# Patient Record
Sex: MALE | Race: WHITE | URBAN - METROPOLITAN AREA
[De-identification: names, ages, dates, MRNs, and addresses within clinical notes are randomized per-mention and may not be internally consistent; named-entity substitution may affect disease eponyms.]

---

## 2023-05-19 ENCOUNTER — OFFICE VISIT (OUTPATIENT)
Dept: PRIMARY CARE | Facility: CLINIC | Age: 28
End: 2023-05-19
Payer: COMMERCIAL

## 2023-05-19 VITALS
DIASTOLIC BLOOD PRESSURE: 72 MMHG | HEIGHT: 71 IN | BODY MASS INDEX: 25.34 KG/M2 | HEART RATE: 63 BPM | WEIGHT: 181 LBS | OXYGEN SATURATION: 98 % | SYSTOLIC BLOOD PRESSURE: 117 MMHG | TEMPERATURE: 97.9 F

## 2023-05-19 DIAGNOSIS — Z76.89 ENCOUNTER TO ESTABLISH CARE: Primary | ICD-10-CM

## 2023-05-19 DIAGNOSIS — R68.82 DECREASED LIBIDO: ICD-10-CM

## 2023-05-19 DIAGNOSIS — R53.83 FATIGUE, UNSPECIFIED TYPE: ICD-10-CM

## 2023-05-19 PROCEDURE — 1036F TOBACCO NON-USER: CPT | Performed by: STUDENT IN AN ORGANIZED HEALTH CARE EDUCATION/TRAINING PROGRAM

## 2023-05-19 PROCEDURE — 99203 OFFICE O/P NEW LOW 30 MIN: CPT | Performed by: STUDENT IN AN ORGANIZED HEALTH CARE EDUCATION/TRAINING PROGRAM

## 2023-05-19 RX ORDER — SERTRALINE HYDROCHLORIDE 100 MG/1
100 TABLET, FILM COATED ORAL DAILY
COMMUNITY
End: 2023-10-27 | Stop reason: SDUPTHER

## 2023-05-19 RX ORDER — TIAGABINE HYDROCHLORIDE 4 MG/1
4 TABLET, FILM COATED ORAL NIGHTLY
COMMUNITY
End: 2023-12-12 | Stop reason: SDUPTHER

## 2023-05-19 ASSESSMENT — PAIN SCALES - GENERAL: PAINLEVEL: 0-NO PAIN

## 2023-05-19 NOTE — PROGRESS NOTES
Establish Care  - No acute concerns today   - PMHx notable for anxiety  - Takes Sertaline 100mg and Tiagabine 4mg   - Denies any PSHx  - Allergy to Cefecil; hives  - Potential shellfish allergy  - Denies any tobacco use; former vape user  - Occasional alcohol use  - Sexually active; defers STD testing   - maternal aunt breast cancer and maternal grandfather leukimia; dad has high blood pressure and diabetes  - Works in [employer]  - Requesting Testosterone level check due to low energy and concern for libido    Physical Exam  Constitutional: Well developed, awake, alert, oriented x3  Head and Face: NCAT  Eyes: Normal external exam, EOMI  ENT: Normal external inspection of ears and nose. Oropharynx normal.  Cardiovascular: RRR, S1/S2, no murmurs, rubs, or gallops, radial pulses +2, no edema of extremities  Pulmonary: CTAB, no respiratory distress.  Abdomen: +BS, soft, non-tender, nondistended, no guarding or rebound, no masses noted  Neuro: A&O x3, CN II-XII grossly intact  MSK: No joint swelling, normal movements of all extremities. Range of motion- normal.  Skin- No lesions, contusions, or erythema.  Psychiatric: Judgment intact. Appropriate mood and behavior     28 year old M presenting to clinic to establish care with a new PCP.    #Establish Care  - CBC ordered  - CMP ordered  - Lipid panel ordered  - A1C ordered  - Syphilis screening ordered  - HIV ordered  - HCV ordered     #Low energy  #Decreased libido  - Testosterone level ordered  - Advised that patient will need to get level in the morning for most accurate reading    Discussed with Dr. Troy Maxwell MD PGY-3  Family Medicine   [facility]

## 2023-06-12 NOTE — PROGRESS NOTES
I reviewed with the resident the medical history and the resident’s findings on physical examination.  I discussed with the resident the patient’s diagnosis and concur with the treatment plan as documented in the resident note.     Marino Simmons MD

## 2023-09-30 PROBLEM — L85.8 OTHER SPECIFIED EPIDERMAL THICKENING: Status: ACTIVE | Noted: 2023-04-26

## 2023-09-30 PROBLEM — L57.9 SKIN CHANGES DUE TO CHRONIC EXPOSURE TO NONIONIZING RADIATION, UNSPECIFIED: Status: ACTIVE | Noted: 2023-04-26

## 2023-09-30 PROBLEM — L81.4 OTHER MELANIN HYPERPIGMENTATION: Status: ACTIVE | Noted: 2023-04-26

## 2023-09-30 PROBLEM — D22.5 MELANOCYTIC NEVI OF TRUNK: Status: ACTIVE | Noted: 2023-04-26

## 2023-09-30 PROBLEM — D22.39 MELANOCYTIC NEVI OF OTHER PARTS OF FACE: Status: ACTIVE | Noted: 2023-04-26

## 2023-09-30 PROBLEM — N50.9 TESTICULAR ABNORMALITY: Status: ACTIVE | Noted: 2023-09-30

## 2023-09-30 PROBLEM — D22.4 MELANOCYTIC NEVI OF SCALP AND NECK: Status: ACTIVE | Noted: 2023-04-26

## 2023-09-30 PROBLEM — D22.60 MELANOCYTIC NEVI OF UNSPECIFIED UPPER LIMB, INCLUDING SHOULDER: Status: ACTIVE | Noted: 2023-04-26

## 2023-09-30 PROBLEM — Q55.22 RETRACTIBLE TESTIS: Status: ACTIVE | Noted: 2023-09-30

## 2023-09-30 PROBLEM — D22.70 MELANOCYTIC NEVI OF UNSPECIFIED LOWER LIMB, INCLUDING HIP: Status: ACTIVE | Noted: 2023-04-26

## 2023-09-30 PROBLEM — F41.1 GENERALIZED ANXIETY DISORDER: Status: ACTIVE | Noted: 2023-09-30

## 2023-09-30 PROBLEM — G47.00 INSOMNIA: Status: ACTIVE | Noted: 2023-09-30

## 2023-09-30 RX ORDER — AMMONIUM LACTATE 12 G/100G
1 LOTION TOPICAL
COMMUNITY
Start: 2023-04-26

## 2023-09-30 RX ORDER — TIAGABINE HYDROCHLORIDE 4 MG/1
2 TABLET, FILM COATED ORAL DAILY
COMMUNITY
End: 2023-12-12 | Stop reason: SDUPTHER

## 2023-09-30 RX ORDER — TRAZODONE HYDROCHLORIDE 50 MG/1
1 TABLET ORAL NIGHTLY PRN
COMMUNITY
End: 2024-06-05 | Stop reason: SDUPTHER

## 2023-10-03 ENCOUNTER — APPOINTMENT (OUTPATIENT)
Dept: BEHAVIORAL HEALTH | Facility: CLINIC | Age: 28
End: 2023-10-03
Payer: COMMERCIAL

## 2023-10-03 ENCOUNTER — TELEMEDICINE (OUTPATIENT)
Dept: BEHAVIORAL HEALTH | Facility: CLINIC | Age: 28
End: 2023-10-03
Payer: COMMERCIAL

## 2023-10-03 DIAGNOSIS — F41.1 GENERALIZED ANXIETY DISORDER: Primary | ICD-10-CM

## 2023-10-03 DIAGNOSIS — F42.2 MIXED OBSESSIONAL THOUGHTS AND ACTS: ICD-10-CM

## 2023-10-03 PROCEDURE — 90837 PSYTX W PT 60 MINUTES: CPT | Performed by: PSYCHOLOGIST

## 2023-10-03 NOTE — PROGRESS NOTES
START TIME:  9 AM  END TIME:  10 AM    Diagnoses/Problems  RAKESH  OCD  Major Depressive Disorder, single episode, mild       Orders  Patient agreed to return for psychotherapy with this provider.     Individual Treatment Goals:    1. Eliminate OCD behaviors  2. Improve decision making ability- ACHIEVED  3. Improve ability to commit to decisions- ACHIEVED  4. Improve self-esteem    Note dictated with [employer] transcription software. Completed without full typed error editing and sent to avoid delay.         Chief Complaint    An interactive audio and video telecommunication system which permits real time communications between the patient (at the originating site) and provider (at the distant site) was utilized to provide this telehealth service.    Verbal consent was requested and obtained from CASSI MÉNDEZ on this date, 10/03/2023 at 9AM , for a telehealth visit.       Past Medical History  Problems    · Patient denies significant medical history    Social History  Problems    · Never a smoker   · Occasional alcohol use    Current Meds    Medication Name Instruction   Sertraline HCl - 100 MG Oral Tablet Take 1 tablet daily   tiaGABine HCl - 4 MG Oral Tablet TAKE 2 TABLETS BY MOUTH EVERY DAY (MAX DAILY DOSAGE IS 8MG)   traZODone HCl - 50 MG Oral Tablet TAKE 1 TABLET BY MOUTH EVERY DAY AT BEDTIME AS NEEDED FOR SLEEP     Mental Status Exam  No suicidal ideation nor intent.       Narrative    Telephone/Televideo Informed Consent for Psychotherapy was reviewed with the patient as follows:  There are potential benefits and risks of the use of telephone or video-conferencing that differ from in-person sessions. Specifically, the telephone or televideo system we are using may not be HIPAA compliant and may present limits to patient confidentiality. Confidentiality still applies for telepsychology services, and nobody will record the session without your permission. You agree to use the telephone or  "video-conferencing platform selected for our virtual sessions, and I will explain how to use it.  1) You need to use a webcam or smartphone during the session.  2) It is important that you be in a quiet, private space that is free of distractions (including cell phone or other devices) during the session.  3) It is important to use a secure internet connection rather than public/free Wi-Fi.  4) It is important to be on time. If you need to cancel or change your tele-appointment, you must notify the psychologist in advance by phone or email.  5) We need a back-up plan (e.g., phone number where you can be reached) to restart the session or to reschedule it, in the event of technical problems.  6) We need a safety plan that includes at least one emergency contact and the closest emergency room to your location, in the event of a crisis situation.  7) If you are not an adult, we need the permission of your parent or legal guardian (and their contact information) for you to participate in telepsychology sessions.  Understanding and verbal agreement was attested to by the patient.    Patient was reached via video for today's session.  Patient reported that he has been doing fairly well lately.  He said that he ended up weighing himself when he was about 180 pounds.  Within a few days he got back down to 165.  Patient said that he is ready to be done.  In fact, it sounds like he has a plan to spend a week after his next vacation which will be over next week, trying to get to his goal weight but after that week stopping and no longer weighing himself.  I agreed that this is a good plan.  Patient has been practicing gratitude.  He said that he has not been writing it down however.  He noticed that much of what he is grateful for is the relationships in his life.  Patient said that his grandmother is in the ICU and they are unsure what is wrong with her but it sounds like she is \"giving up\" after her  .  We talked " about what it was like growing up with his father and other influences in his life.  Patient tended to look up to motivational speakers and other hyper masculine men while growing up because his father was perceived to be the opposite.  We plan to follow up on this in more in the next session.

## 2023-10-12 ENCOUNTER — TELEMEDICINE (OUTPATIENT)
Dept: BEHAVIORAL HEALTH | Facility: CLINIC | Age: 28
End: 2023-10-12
Payer: COMMERCIAL

## 2023-10-12 DIAGNOSIS — F41.1 GENERALIZED ANXIETY DISORDER: Primary | ICD-10-CM

## 2023-10-12 DIAGNOSIS — F42.2 MIXED OBSESSIONAL THOUGHTS AND ACTS: ICD-10-CM

## 2023-10-12 PROCEDURE — 90837 PSYTX W PT 60 MINUTES: CPT | Performed by: PSYCHOLOGIST

## 2023-10-12 NOTE — PROGRESS NOTES
RT responded to \"neuro alert\" in ED room 10.  Pt was on RA.  SpO2 98%.   RT was not needed at this time per medical staff in patient's room.   START TIME:  5 PM  END TIME:  6 PM      Diagnoses/Problems  RAKESH  OCD  Major Depressive Disorder, single episode, mild       Orders  Patient agreed to return for psychotherapy with this provider.     Individual Treatment Goals:    1. Eliminate OCD behaviors  2. Improve decision making ability- ACHIEVED  3. Improve ability to commit to decisions- ACHIEVED  4. Improve self-esteem    Note dictated with [employer] transcription software. Completed without full typed error editing and sent to avoid delay.         Chief Complaint    An interactive audio and video telecommunication system which permits real time communications between the patient (at the originating site) and provider (at the distant site) was utilized to provide this telehealth service.    Verbal consent was requested and obtained from CASSI MÉNDEZ on this date, 10/12/2023 at 5PM , for a telehealth visit.       Past Medical History  Problems    · Patient denies significant medical history    Social History  Problems    · Never a smoker   · Occasional alcohol use    Current Meds    Medication Name Instruction   Sertraline HCl - 100 MG Oral Tablet Take 1 tablet daily   tiaGABine HCl - 4 MG Oral Tablet TAKE 2 TABLETS BY MOUTH EVERY DAY (MAX DAILY DOSAGE IS 8MG)   traZODone HCl - 50 MG Oral Tablet TAKE 1 TABLET BY MOUTH EVERY DAY AT BEDTIME AS NEEDED FOR SLEEP     Mental Status Exam  No suicidal ideation nor intent.       Narrative    Telephone/Televideo Informed Consent for Psychotherapy was reviewed with the patient as follows:  There are potential benefits and risks of the use of telephone or video-conferencing that differ from in-person sessions. Specifically, the telephone or televideo system we are using may not be HIPAA compliant and may present limits to patient confidentiality. Confidentiality still applies for telepsychology services, and nobody will record the session without your permission. You agree to use the telephone or  video-conferencing platform selected for our virtual sessions, and I will explain how to use it.  1) You need to use a webcam or smartphone during the session.  2) It is important that you be in a quiet, private space that is free of distractions (including cell phone or other devices) during the session.  3) It is important to use a secure internet connection rather than public/free Wi-Fi.  4) It is important to be on time. If you need to cancel or change your tele-appointment, you must notify the psychologist in advance by phone or email.  5) We need a back-up plan (e.g., phone number where you can be reached) to restart the session or to reschedule it, in the event of technical problems.  6) We need a safety plan that includes at least one emergency contact and the closest emergency room to your location, in the event of a crisis situation.  7) If you are not an adult, we need the permission of your parent or legal guardian (and their contact information) for you to participate in telepsychology sessions.  Understanding and verbal agreement was attested to by the patient.    Patient was reached via video for today's session.  Patient said he went on a vacation with his siblings to [place] and enjoyed himself despite having all sorts of trouble with the airline when they were trying to get there.  Patient said he has decided to continue the diet for another 2 weeks (but not as intensely) and then go back to a normal diet.  He said he is not doing the reverse diet like previously planned.  We spent most of today's session talking about his childhood and how that impacts him today.  Patient explained that he was having about 4 panic attacks, spread throughout the day, every day for 3-4 years from 3rd grade to 7th grade.  He said his mother would often yell at him when he was having panic attacks and his father was having panic attacks as well.  Unfortunately, patient was put in a position as a child to try to  "support both his father and try to prevent stress from affecting his younger brother (5 years younger).  Patient stated that he had zero support as a child and we agreed that his need for control and certainty stems from these experiences.  I explained how trauma impacts people and how it can be difficult to separate the past from the present.  We agreed that he tries to take control over everything and when he cannot control things, he becomes overwhelmed.  We discussed how his brain is still \"believing\" that he has no control, which was the case when he was a child.  Patient admitted to feeling quite distressed by talking about his childhood and that he worries that talking about it more with open \"pandora's box.\"  We discussed how this is a common belief and why it is inaccurate.  We plan to follow up more on this next session.    "

## 2023-10-24 ENCOUNTER — TELEMEDICINE (OUTPATIENT)
Dept: BEHAVIORAL HEALTH | Facility: CLINIC | Age: 28
End: 2023-10-24
Payer: COMMERCIAL

## 2023-10-24 DIAGNOSIS — F42.2 MIXED OBSESSIONAL THOUGHTS AND ACTS: ICD-10-CM

## 2023-10-24 DIAGNOSIS — F41.1 GENERALIZED ANXIETY DISORDER: Primary | ICD-10-CM

## 2023-10-24 PROCEDURE — 90837 PSYTX W PT 60 MINUTES: CPT | Performed by: PSYCHOLOGIST

## 2023-10-24 NOTE — PROGRESS NOTES
START TIME:  6 PM  END TIME:  7 PM      Diagnoses/Problems  RAKESH  OCD  Major Depressive Disorder, single episode, mild       Orders  Patient agreed to return for psychotherapy with this provider.     Individual Treatment Goals:    1. Eliminate OCD behaviors  2. Improve decision making ability- ACHIEVED  3. Improve ability to commit to decisions- ACHIEVED  4. Improve self-esteem    Note dictated with [employer] transcription software. Completed without full typed error editing and sent to avoid delay.         Chief Complaint    An interactive audio and video telecommunication system which permits real time communications between the patient (at the originating site) and provider (at the distant site) was utilized to provide this telehealth service.    Verbal consent was requested and obtained from CASSI MÉNDEZ on this date, 10/12/2023 at 5PM , for a telehealth visit.       Past Medical History  Problems    · Patient denies significant medical history    Social History  Problems    · Never a smoker   · Occasional alcohol use    Current Meds    Medication Name Instruction   Sertraline HCl - 100 MG Oral Tablet Take 1 tablet daily   tiaGABine HCl - 4 MG Oral Tablet TAKE 2 TABLETS BY MOUTH EVERY DAY (MAX DAILY DOSAGE IS 8MG)   traZODone HCl - 50 MG Oral Tablet TAKE 1 TABLET BY MOUTH EVERY DAY AT BEDTIME AS NEEDED FOR SLEEP     Mental Status Exam  No suicidal ideation nor intent.       Narrative    Telephone/Televideo Informed Consent for Psychotherapy was reviewed with the patient as follows:  There are potential benefits and risks of the use of telephone or video-conferencing that differ from in-person sessions. Specifically, the telephone or televideo system we are using may not be HIPAA compliant and may present limits to patient confidentiality. Confidentiality still applies for telepsychology services, and nobody will record the session without your permission. You agree to use the telephone or  video-conferencing platform selected for our virtual sessions, and I will explain how to use it.  1) You need to use a webcam or smartphone during the session.  2) It is important that you be in a quiet, private space that is free of distractions (including cell phone or other devices) during the session.  3) It is important to use a secure internet connection rather than public/free Wi-Fi.  4) It is important to be on time. If you need to cancel or change your tele-appointment, you must notify the psychologist in advance by phone or email.  5) We need a back-up plan (e.g., phone number where you can be reached) to restart the session or to reschedule it, in the event of technical problems.  6) We need a safety plan that includes at least one emergency contact and the closest emergency room to your location, in the event of a crisis situation.  7) If you are not an adult, we need the permission of your parent or legal guardian (and their contact information) for you to participate in telepsychology sessions.  Understanding and verbal agreement was attested to by the patient.    Patient was reached via video for today's session.  We spent today's session processing what we had discussed in the previous session.  Patient said that he was rather emotional after our last session.  He said a few days later, he was listening to an audio book (The Subtle Art of Not Giving a Fuck) and this inspired him to write about our last session in addition to some of the things that he heard in the book.  Patient essentially came to the conclusion that after overcoming the daily panic attacks and other stressors of his childhood, he felt that he was capable of anything and most importantly wanted to ensure that he did not waste this capability.  Patient still feels like he is capable of almost anything but we agreed the problem is primarily related to the fact that he thinks there is an end goal that will Ga[place]ner him respect and  admiration.  Instead, we tried to reframe this as the most important thing being the fact that he thinks he is capable of these things and is certainly resilient.  Patient said when he thinks about his childhood, he feels wronged, sad, hurt, abandoned and upset.  He said there is certainly a part of him that resents his parents for not being able to be there for him in the way that he needed.  We talked about how his perceptions seems a lot related to survivor's guilt in a lot of ways although he did not experience that per se.  We agreed to follow up on this some more in the next session.

## 2023-10-27 ENCOUNTER — TELEPHONE (OUTPATIENT)
Dept: OTHER | Age: 28
End: 2023-10-27
Payer: COMMERCIAL

## 2023-10-27 DIAGNOSIS — F41.1 GENERALIZED ANXIETY DISORDER: ICD-10-CM

## 2023-10-27 RX ORDER — SERTRALINE HYDROCHLORIDE 100 MG/1
100 TABLET, FILM COATED ORAL DAILY
Qty: 30 TABLET | Refills: 1 | Status: SHIPPED | OUTPATIENT
Start: 2023-10-27 | End: 2023-12-06 | Stop reason: SDUPTHER

## 2023-11-02 ENCOUNTER — TELEMEDICINE (OUTPATIENT)
Dept: BEHAVIORAL HEALTH | Facility: CLINIC | Age: 28
End: 2023-11-02
Payer: COMMERCIAL

## 2023-11-02 DIAGNOSIS — F42.2 MIXED OBSESSIONAL THOUGHTS AND ACTS: ICD-10-CM

## 2023-11-02 DIAGNOSIS — F41.1 GENERALIZED ANXIETY DISORDER: ICD-10-CM

## 2023-11-02 PROCEDURE — 90837 PSYTX W PT 60 MINUTES: CPT | Performed by: PSYCHOLOGIST

## 2023-11-03 NOTE — PROGRESS NOTES
START TIME:  8 PM  END TIME:  8:55 PM      Diagnoses/Problems  RAKESH  OCD  Major Depressive Disorder, single episode, mild       Orders  Patient agreed to return for psychotherapy with this provider.     Individual Treatment Goals:    1. Eliminate OCD behaviors  2. Improve decision making ability- ACHIEVED  3. Improve ability to commit to decisions- ACHIEVED  4. Improve self-esteem    Note dictated with [employer] transcription software. Completed without full typed error editing and sent to avoid delay.         Chief Complaint    An interactive audio and video telecommunication system which permits real time communications between the patient (at the originating site) and provider (at the distant site) was utilized to provide this telehealth service.    Verbal consent was requested and obtained from CASSI MÉNDEZ      Past Medical History  Problems    · Patient denies significant medical history    Social History  Problems    · Never a smoker   · Occasional alcohol use    Mental Status Exam  No suicidal ideation nor intent.       Narrative    Telephone/Televideo Informed Consent for Psychotherapy was reviewed with the patient as follows:  There are potential benefits and risks of the use of telephone or video-conferencing that differ from in-person sessions. Specifically, the telephone or televideo system we are using may not be HIPAA compliant and may present limits to patient confidentiality. Confidentiality still applies for telepsychology services, and nobody will record the session without your permission. You agree to use the telephone or video-conferencing platform selected for our virtual sessions, and I will explain how to use it.  1) You need to use a webcam or smartphone during the session.  2) It is important that you be in a quiet, private space that is free of distractions (including cell phone or other devices) during the session.  3) It is important to use a secure internet connection rather  "than public/free Wi-Fi.  4) It is important to be on time. If you need to cancel or change your tele-appointment, you must notify the psychologist in advance by phone or email.  5) We need a back-up plan (e.g., phone number where you can be reached) to restart the session or to reschedule it, in the event of technical problems.  6) We need a safety plan that includes at least one emergency contact and the closest emergency room to your location, in the event of a crisis situation.  7) If you are not an adult, we need the permission of your parent or legal guardian (and their contact information) for you to participate in telepsychology sessions.  Understanding and verbal agreement was attested to by the patient.    Patient was reached via video for today's session.  Patient reported that he has been doing very well this week.  He said he has been doing great at work and is getting out to customers more.  We agreed that just getting out of his apartment and having social interactions for work probably plays a large role in how he feels now. Also, he stated that he is having trouble giving up dieting, however, he has changed his approach to it.  He is not counting calories or weighing himself but is eating \"very clean.\"  He has a sense of how many calories he is taking in but not obsessing over it.  He is actually lifting weights more and has been using the sauna which he said has been significantly improving the quality of his sleep.  He said he is beginning to value his ability to adapt and overcome challenges and is seeing this more as a character trait rather than a means to achieving something great.  He still wants to do great things but this new perspective has made it a little easier to focus on the process.  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  We discussed the impact of doing a little bit every day, over time.  We plan to " follow up on this more in the next session.

## 2023-11-09 ENCOUNTER — APPOINTMENT (OUTPATIENT)
Dept: BEHAVIORAL HEALTH | Facility: CLINIC | Age: 28
End: 2023-11-09
Payer: COMMERCIAL

## 2023-11-14 ENCOUNTER — TELEMEDICINE (OUTPATIENT)
Dept: BEHAVIORAL HEALTH | Facility: CLINIC | Age: 28
End: 2023-11-14
Payer: COMMERCIAL

## 2023-11-14 DIAGNOSIS — F42.2 MIXED OBSESSIONAL THOUGHTS AND ACTS: ICD-10-CM

## 2023-11-14 DIAGNOSIS — F41.1 GENERALIZED ANXIETY DISORDER: Primary | ICD-10-CM

## 2023-11-14 PROCEDURE — 90837 PSYTX W PT 60 MINUTES: CPT | Performed by: PSYCHOLOGIST

## 2023-11-15 NOTE — PROGRESS NOTES
START TIME:  7 PM  END TIME:  7:55 PM      Diagnoses/Problems  RAKESH  OCD  Major Depressive Disorder, single episode, mild       Orders  Patient agreed to return for psychotherapy with this provider.     Individual Treatment Goals:    1. Eliminate OCD behaviors  2. Improve decision making ability- ACHIEVED  3. Improve ability to commit to decisions- ACHIEVED  4. Improve self-esteem    Note dictated with [employer] transcription software. Completed without full typed error editing and sent to avoid delay.         Chief Complaint    An interactive audio and video telecommunication system which permits real time communications between the patient (at the originating site) and provider (at the distant site) was utilized to provide this telehealth service.    Verbal consent was requested and obtained from CASSI MÉNDEZ      Past Medical History  Problems    · Patient denies significant medical history    Social History  Problems    · Never a smoker   · Occasional alcohol use    Mental Status Exam  No suicidal ideation nor intent.       Narrative    Telephone/Televideo Informed Consent for Psychotherapy was reviewed with the patient as follows:  There are potential benefits and risks of the use of telephone or video-conferencing that differ from in-person sessions. Specifically, the telephone or televideo system we are using may not be HIPAA compliant and may present limits to patient confidentiality. Confidentiality still applies for telepsychology services, and nobody will record the session without your permission. You agree to use the telephone or video-conferencing platform selected for our virtual sessions, and I will explain how to use it.  1) You need to use a webcam or smartphone during the session.  2) It is important that you be in a quiet, private space that is free of distractions (including cell phone or other devices) during the session.  3) It is important to use a secure internet connection rather  than public/free Wi-Fi.  4) It is important to be on time. If you need to cancel or change your tele-appointment, you must notify the psychologist in advance by phone or email.  5) We need a back-up plan (e.g., phone number where you can be reached) to restart the session or to reschedule it, in the event of technical problems.  6) We need a safety plan that includes at least one emergency contact and the closest emergency room to your location, in the event of a crisis situation.  7) If you are not an adult, we need the permission of your parent or legal guardian (and their contact information) for you to participate in telepsychology sessions.  Understanding and verbal agreement was attested to by the patient.    Patient was reached via video for today's session.  Patient said that he has been doing well lately.  He said that he has been working out very hard and seeing results and this makes him feel really good.  We talked about how he feels like he has too pushed himself fairly hard in order to get pleasure out of doing things.  We discussed the pros and cons of this.  Patient does have a hard time figuring out when to stop because he falls into a reinforcement loop.  However, the patient does make sure to make time for sleep and of course exercise.  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  We also talked about how the patient's ex-girlfriend contacted him about their dog.  Patient was thrown off by this but it did not bother him that much.  He is not looking into what she has been up to lately and has no interest in getting back together with her.  We ended the session by talking about an oversight that payroll made which caused him to be over paid by $20,000.  Patient said that he has been getting overpaid all year and they just recognize this.  He is anxious about having to owe that money back  possibly over the course of just the next year.  We talked a little bit about his student loan that he owes back and how we can adjust the payment as well as his tax withholdings.  We plan to follow up on this and his difficulty enjoying things when he is not pushing himself more in the next session.

## 2023-11-17 ENCOUNTER — APPOINTMENT (OUTPATIENT)
Dept: BEHAVIORAL HEALTH | Facility: CLINIC | Age: 28
End: 2023-11-17
Payer: COMMERCIAL

## 2023-11-21 ENCOUNTER — TELEMEDICINE (OUTPATIENT)
Dept: BEHAVIORAL HEALTH | Facility: CLINIC | Age: 28
End: 2023-11-21
Payer: COMMERCIAL

## 2023-11-21 DIAGNOSIS — F41.1 GENERALIZED ANXIETY DISORDER: Primary | ICD-10-CM

## 2023-11-21 DIAGNOSIS — F42.2 MIXED OBSESSIONAL THOUGHTS AND ACTS: ICD-10-CM

## 2023-11-21 PROCEDURE — 90837 PSYTX W PT 60 MINUTES: CPT | Performed by: PSYCHOLOGIST

## 2023-11-21 NOTE — PROGRESS NOTES
START TIME:  6 PM  END TIME:  6:55 PM      Diagnoses/Problems  RAKESH  OCD  Major Depressive Disorder, single episode, mild       Orders  Patient agreed to return for psychotherapy with this provider.     Individual Treatment Goals:    1. Eliminate OCD behaviors  2. Improve decision making ability- ACHIEVED  3. Improve ability to commit to decisions- ACHIEVED  4. Improve self-esteem    Note dictated with [employer] transcription software. Completed without full typed error editing and sent to avoid delay.         Chief Complaint    An interactive audio and video telecommunication system which permits real time communications between the patient (at the originating site) and provider (at the distant site) was utilized to provide this telehealth service.    Verbal consent was requested and obtained from CASSI MÉNDEZ      Past Medical History  Problems    · Patient denies significant medical history    Social History  Problems    · Never a smoker   · Occasional alcohol use    Mental Status Exam  No suicidal ideation nor intent.       Narrative    Telephone/Televideo Informed Consent for Psychotherapy was reviewed with the patient as follows:  There are potential benefits and risks of the use of telephone or video-conferencing that differ from in-person sessions. Specifically, the telephone or televideo system we are using may not be HIPAA compliant and may present limits to patient confidentiality. Confidentiality still applies for telepsychology services, and nobody will record the session without your permission. You agree to use the telephone or video-conferencing platform selected for our virtual sessions, and I will explain how to use it.  1) You need to use a webcam or smartphone during the session.  2) It is important that you be in a quiet, private space that is free of distractions (including cell phone or other devices) during the session.  3) It is important to use a secure internet connection rather  than public/free Wi-Fi.  4) It is important to be on time. If you need to cancel or change your tele-appointment, you must notify the psychologist in advance by phone or email.  5) We need a back-up plan (e.g., phone number where you can be reached) to restart the session or to reschedule it, in the event of technical problems.  6) We need a safety plan that includes at least one emergency contact and the closest emergency room to your location, in the event of a crisis situation.  7) If you are not an adult, we need the permission of your parent or legal guardian (and their contact information) for you to participate in telepsychology sessions.  Understanding and verbal agreement was attested to by the patient.    Patient was reached via video for today's session.  Patient said that he has been doing well lately.  We talked about how his confidence has increased significantly.  We discussed the reasons for this and it primarily has to do with things that are mostly within his control such as exercise and effort put into work.  These are also things that he tends to be good at as well.  He said that he really has no interest in dating right now.  We talked about how this is likely because there are many variables outside of his control with dating and he does not feel especially confident dating.  Also, the way that he approaches problems and the rest of his life is not necessarily the way he should approach dating.  We talked about how in the past we had discussed the fact that when he starts caring about someone is when he begins to over think the relationship and try to be somewhat he is not.  However, it sounds like it is mostly when he gets negative feedback from someone he cares about that he begins to behave in this way.  Patient said that every person he is ever dated came into his life when he was not looking for anyone in particular.  We discussed the possible reasons for this and how we can maybe recreate  it.  Ultimately, we agreed that the patient should continue doing the things that are boosting his self-esteem and confidence and continue to put less pressure on himself to date.  We plan to follow up on this more in the next session.

## 2023-11-30 ENCOUNTER — TELEMEDICINE (OUTPATIENT)
Dept: BEHAVIORAL HEALTH | Facility: CLINIC | Age: 28
End: 2023-11-30
Payer: COMMERCIAL

## 2023-11-30 DIAGNOSIS — F41.1 GENERALIZED ANXIETY DISORDER: Primary | ICD-10-CM

## 2023-11-30 DIAGNOSIS — F42.2 MIXED OBSESSIONAL THOUGHTS AND ACTS: ICD-10-CM

## 2023-11-30 PROCEDURE — 90837 PSYTX W PT 60 MINUTES: CPT | Performed by: PSYCHOLOGIST

## 2023-11-30 NOTE — PROGRESS NOTES
START TIME:  6 PM  END TIME:  6:53 PM      Diagnoses/Problems  RAKESH  OCD  Major Depressive Disorder, single episode, mild       Orders  Patient agreed to return for psychotherapy with this provider.     Individual Treatment Goals:    1. Eliminate OCD behaviors  2. Improve decision making ability- ACHIEVED  3. Improve ability to commit to decisions- ACHIEVED  4. Improve self-esteem    Note dictated with [employer] transcription software. Completed without full typed error editing and sent to avoid delay.         Chief Complaint    An interactive audio and video telecommunication system which permits real time communications between the patient (at the originating site) and provider (at the distant site) was utilized to provide this telehealth service.    Verbal consent was requested and obtained from CASSI MÉNDEZ      Past Medical History  Problems    · Patient denies significant medical history    Social History  Problems    · Never a smoker   · Occasional alcohol use    Mental Status Exam  No suicidal ideation nor intent.       Narrative    Telephone/Televideo Informed Consent for Psychotherapy was reviewed with the patient as follows:  There are potential benefits and risks of the use of telephone or video-conferencing that differ from in-person sessions. Specifically, the telephone or televideo system we are using may not be HIPAA compliant and may present limits to patient confidentiality. Confidentiality still applies for telepsychology services, and nobody will record the session without your permission. You agree to use the telephone or video-conferencing platform selected for our virtual sessions, and I will explain how to use it.  1) You need to use a webcam or smartphone during the session.  2) It is important that you be in a quiet, private space that is free of distractions (including cell phone or other devices) during the session.  3) It is important to use a secure internet connection rather  than public/free Wi-Fi.  4) It is important to be on time. If you need to cancel or change your tele-appointment, you must notify the psychologist in advance by phone or email.  5) We need a back-up plan (e.g., phone number where you can be reached) to restart the session or to reschedule it, in the event of technical problems.  6) We need a safety plan that includes at least one emergency contact and the closest emergency room to your location, in the event of a crisis situation.  7) If you are not an adult, we need the permission of your parent or legal guardian (and their contact information) for you to participate in telepsychology sessions.  Understanding and verbal agreement was attested to by the patient.    Patient was reached via video for today's session.  Patient reported that he has been especially tired lately.  He said that this has negatively affected his mood a bit.  He stated that he has been having difficulty staying awake during the day and even took a nap in the middle of the day earlier this week.  It seems highly likely that he is coming down with a cold or perhaps COVID.  He plans to test himself later tonight.  Patient said that he has been not exercising as much because of this.  We talked about how a lot of his self worth is tied to productivity.  We also discussed his reaction some negative feedback in general not just from girlfriends.  He tends to try and go above and beyond every time he gets negative feedback and this can lead to burnout or, in the case of dating, it just backfiring.  We agreed to follow up on this some more in the next session.

## 2023-12-05 ENCOUNTER — TELEMEDICINE (OUTPATIENT)
Dept: BEHAVIORAL HEALTH | Facility: CLINIC | Age: 28
End: 2023-12-05
Payer: COMMERCIAL

## 2023-12-05 DIAGNOSIS — F41.1 GENERALIZED ANXIETY DISORDER: ICD-10-CM

## 2023-12-05 DIAGNOSIS — F42.2 MIXED OBSESSIONAL THOUGHTS AND ACTS: ICD-10-CM

## 2023-12-05 PROCEDURE — 90837 PSYTX W PT 60 MINUTES: CPT | Performed by: PSYCHOLOGIST

## 2023-12-06 ENCOUNTER — TELEPHONE (OUTPATIENT)
Dept: OTHER | Age: 28
End: 2023-12-06
Payer: COMMERCIAL

## 2023-12-06 DIAGNOSIS — F41.1 GENERALIZED ANXIETY DISORDER: ICD-10-CM

## 2023-12-06 RX ORDER — SERTRALINE HYDROCHLORIDE 100 MG/1
100 TABLET, FILM COATED ORAL DAILY
Qty: 90 TABLET | Refills: 0 | Status: SHIPPED | OUTPATIENT
Start: 2023-12-06 | End: 2024-03-01

## 2023-12-06 NOTE — PROGRESS NOTES
START TIME:  8 PM  END TIME:  8:53 PM      Diagnoses/Problems  RAKESH  OCD  Major Depressive Disorder, single episode, mild       Orders  Patient agreed to return for psychotherapy with this provider.     Individual Treatment Goals:    1. Eliminate OCD behaviors  2. Improve decision making ability- ACHIEVED  3. Improve ability to commit to decisions- ACHIEVED  4. Improve self-esteem    Note dictated with [employer] transcription software. Completed without full typed error editing and sent to avoid delay.         Chief Complaint    An interactive audio and video telecommunication system which permits real time communications between the patient (at the originating site) and provider (at the distant site) was utilized to provide this telehealth service.    Verbal consent was requested and obtained from CASSI MÉNDEZ      Past Medical History  Problems    · Patient denies significant medical history    Social History  Problems    · Never a smoker   · Occasional alcohol use    Mental Status Exam  No suicidal ideation nor intent.       Narrative    Telephone/Televideo Informed Consent for Psychotherapy was reviewed with the patient as follows:  There are potential benefits and risks of the use of telephone or video-conferencing that differ from in-person sessions. Specifically, the telephone or televideo system we are using may not be HIPAA compliant and may present limits to patient confidentiality. Confidentiality still applies for telepsychology services, and nobody will record the session without your permission. You agree to use the telephone or video-conferencing platform selected for our virtual sessions, and I will explain how to use it.  1) You need to use a webcam or smartphone during the session.  2) It is important that you be in a quiet, private space that is free of distractions (including cell phone or other devices) during the session.  3) It is important to use a secure internet connection rather  than public/free Wi-Fi.  4) It is important to be on time. If you need to cancel or change your tele-appointment, you must notify the psychologist in advance by phone or email.  5) We need a back-up plan (e.g., phone number where you can be reached) to restart the session or to reschedule it, in the event of technical problems.  6) We need a safety plan that includes at least one emergency contact and the closest emergency room to your location, in the event of a crisis situation.  7) If you are not an adult, we need the permission of your parent or legal guardian (and their contact information) for you to participate in telepsychology sessions.  Understanding and verbal agreement was attested to by the patient.    Patient was reached via video for today's session.  Patient reported that he has been feeling a little bit better since last week.  We spent today's session primarily talking about relationships and how he can approach things differently going forward.  Patient said that he already feels like he is going to be approaching things differently because he feels a lot better about himself and he is not as afraid to be alone now.  Patient explained that he thinks that this is because he likes himself more.  We agreed that he is probably set up to find someone now as a result of this.  Patient said that he has 3 dates coming up and we talked about the mindset that he can take into these dates.  I strongly encouraged him to look at it as a 2 way street, meaning that they are both in a way evaluating each other and whether or not there is potential for a long-term relationship.  Often, it seems like the patient is mostly already convinced that he likes the person and that he needs to make sure that he does everything right.  At the end of the session, we talked about setting boundaries in relationships prior to being in the relationship when emotions easily cloud judgment.  We agreed to follow up on this more in  the next session.  Patient plans to make some notes between now and then about what potential boundaries could be for him.

## 2023-12-06 NOTE — TELEPHONE ENCOUNTER
Patient requesting 90 day supply medication order for Sertraline due to insurance requirements. Please submit to the [facility] on [address] in [facility].

## 2023-12-12 ENCOUNTER — TELEMEDICINE (OUTPATIENT)
Dept: BEHAVIORAL HEALTH | Facility: CLINIC | Age: 28
End: 2023-12-12
Payer: COMMERCIAL

## 2023-12-12 DIAGNOSIS — F42.2 MIXED OBSESSIONAL THOUGHTS AND ACTS: ICD-10-CM

## 2023-12-12 DIAGNOSIS — F41.1 GENERALIZED ANXIETY DISORDER: ICD-10-CM

## 2023-12-12 DIAGNOSIS — F41.1 GENERALIZED ANXIETY DISORDER: Primary | ICD-10-CM

## 2023-12-12 DIAGNOSIS — G47.00 INSOMNIA, UNSPECIFIED TYPE: ICD-10-CM

## 2023-12-12 PROCEDURE — 90834 PSYTX W PT 45 MINUTES: CPT | Performed by: PSYCHOLOGIST

## 2023-12-12 PROCEDURE — 99214 OFFICE O/P EST MOD 30 MIN: CPT | Performed by: PSYCHIATRY & NEUROLOGY

## 2023-12-12 RX ORDER — TIAGABINE HYDROCHLORIDE 4 MG/1
8 TABLET, FILM COATED ORAL NIGHTLY
Qty: 180 TABLET | Refills: 1 | Status: SHIPPED | OUTPATIENT
Start: 2023-12-12 | End: 2024-06-05 | Stop reason: SDUPTHER

## 2023-12-12 ASSESSMENT — ENCOUNTER SYMPTOMS
NERVOUS/ANXIOUS: 0
SLEEP DISTURBANCE: 0
DYSPHORIC MOOD: 0

## 2023-12-12 NOTE — PROGRESS NOTES
START TIME:  5:15 PM  END TIME:  6 PM      Diagnoses/Problems  RAKESH  OCD  Major Depressive Disorder, single episode, mild       Orders  Patient agreed to return for psychotherapy with this provider.     Individual Treatment Goals:    1. Eliminate OCD behaviors  2. Improve decision making ability- ACHIEVED  3. Improve ability to commit to decisions- ACHIEVED  4. Improve self-esteem    Note dictated with [employer] transcription software. Completed without full typed error editing and sent to avoid delay.         Chief Complaint    An interactive audio and video telecommunication system which permits real time communications between the patient (at the originating site) and provider (at the distant site) was utilized to provide this telehealth service.    Verbal consent was requested and obtained from CASSI MÉNDEZ      Past Medical History  Problems    · Patient denies significant medical history    Social History  Problems    · Never a smoker   · Occasional alcohol use    Mental Status Exam  No suicidal ideation nor intent.       Narrative    Telephone/Televideo Informed Consent for Psychotherapy was reviewed with the patient as follows:  There are potential benefits and risks of the use of telephone or video-conferencing that differ from in-person sessions. Specifically, the telephone or televideo system we are using may not be HIPAA compliant and may present limits to patient confidentiality. Confidentiality still applies for telepsychology services, and nobody will record the session without your permission. You agree to use the telephone or video-conferencing platform selected for our virtual sessions, and I will explain how to use it.  1) You need to use a webcam or smartphone during the session.  2) It is important that you be in a quiet, private space that is free of distractions (including cell phone or other devices) during the session.  3) It is important to use a secure internet connection rather  "than public/free Wi-Fi.  4) It is important to be on time. If you need to cancel or change your tele-appointment, you must notify the psychologist in advance by phone or email.  5) We need a back-up plan (e.g., phone number where you can be reached) to restart the session or to reschedule it, in the event of technical problems.  6) We need a safety plan that includes at least one emergency contact and the closest emergency room to your location, in the event of a crisis situation.  7) If you are not an adult, we need the permission of your parent or legal guardian (and their contact information) for you to participate in telepsychology sessions.  Understanding and verbal agreement was attested to by the patient.    Patient was reached via video for today's session.  Patient reported that he has been doing well lately.  We talked about how he feels like he is much younger than he actually is and has been getting this feedback lately.  We discussed aging in general and what this experience has been like.  Patient said that he went on one of his 3 dates and it went well but he was not especially attracted to the woman he went on the date with.  We discussed how he handled the situation at work very well.  Patient went into a situation knowing there was conflict and was able to separate his emotions from the other person's emotions in a way that made him much more effective.  He did not take it personally generally.  Patient also discussed some of the boundaries or aspects of someone that he is looking for in a relationship.  He said that he wants someone who is not impulsive generally, materialistic/narcissistic, transactional, only into them for his looks, inconsistent or overly dependent/independent.  He also wants to be with someone who is not trying to change him or criticize him at least in the beginning.  He described this as the relationship being \"easy.\"  He cannot be with someone who does not want children, is " "unable to be \"silly\" and does not make an effort.  Patient essentially wants someone who is fairly balanced in the way they approach the relationship and emotionally.  He continues to feel fairly confident about himself and we agreed that this is a great quality to take into a relationship because it will make him more likely to be himself in the relationship rather than trying to be the person that he believes they want him to be.  We plan to follow up on this more in the next session.  "

## 2023-12-12 NOTE — PROGRESS NOTES
"Adult Ambulatory Psychiatry Progress Note      Assessment/Plan     Impression:  Marino Barba is a 28 y.o. male domiciled alone, employed by [employer] who presents for follow up with [facility] Ins[facility].    Plan:   RAKESH - sertraline 100mg daily, c/w gabatril 4mg BID, c/w med ed, psycho ed and supportive psychotherapy, f/u 6 months  Insomnia - trazodone 50mg qhs prn        Subjective   HPI:  Pt arrived on time. Mood \"good\" since last session. He's been working for [employer] and finishing his first year. He's working in sales. Anxiety has been manageable. Sleeping ok. Appetite ok. Taking medicine as prescribed. Denies significant SE.           Review of Systems   Psychiatric/Behavioral:  Negative for dysphoric mood, sleep disturbance and suicidal ideas. The patient is not nervous/anxious.          Objective   Mental Status Exam:  General Appearance: Well groomed, appropriate eye contact  Attitude/Behavior: Cooperative  Motor: No psychomotor agitation or retardation, no tremor or other abnormal movements  Speech: Normal rate, volume, prosody  Mood: \"ok\"  Affect: Euthymic, full-range  Thought Process: Linear, goal directed  Thought Associations: No loosening of associations  Thought Content: Normal  Perception: No perceptual abnormalities noted  Insight: Intact  Judgement: Intact    Vitals:  There were no vitals filed for this visit.    Current Medications:  Current Outpatient Medications on File Prior to Visit   Medication Sig Dispense Refill    ammonium lactate (Lac-Hydrin) 12 % lotion 1 Application.      MINOXIDIL-FINASTERIDE TOP Apply topically. MINOXIDIL 6%-FINASTERIDE0.3%      sertraline (Zoloft) 100 mg tablet Take 1 tablet (100 mg) by mouth once daily. 90 tablet 0    traZODone (Desyrel) 50 mg tablet Take 1 tablet (50 mg) by mouth as needed at bedtime for sleep.      [DISCONTINUED] sertraline (Zoloft) 100 mg tablet Take 1 tablet (100 mg) by mouth once daily. 30 tablet 1    [DISCONTINUED] " tiaGABine (Gabitril) 4 mg tablet Take 1 tablet (4 mg) by mouth once daily at bedtime.      [DISCONTINUED] tiaGABine (Gabitril) 4 mg tablet Take 2 tablets (8 mg) by mouth once daily. Max dosage is 8 mg       No current facility-administered medications on file prior to visit.       Orders:  Diagnoses and all orders for this visit:  Generalized anxiety disorder  -     tiaGABine (Gabitril) 4 mg tablet; Take 2 tablets (8 mg) by mouth once daily at bedtime.  Insomnia, unspecified type  -     tiaGABine (Gabitril) 4 mg tablet; Take 2 tablets (8 mg) by mouth once daily at bedtime.          Time Spent:    Prep time: 3  Direct patient time: 22  Documentation time: 5  Total time: 30 min    Next Appointment:  Follow up in 6 months (on 6/5/2024).

## 2023-12-19 ENCOUNTER — TELEMEDICINE (OUTPATIENT)
Dept: BEHAVIORAL HEALTH | Facility: CLINIC | Age: 28
End: 2023-12-19
Payer: COMMERCIAL

## 2023-12-19 DIAGNOSIS — F42.2 MIXED OBSESSIONAL THOUGHTS AND ACTS: ICD-10-CM

## 2023-12-19 DIAGNOSIS — F41.1 GENERALIZED ANXIETY DISORDER: Primary | ICD-10-CM

## 2023-12-19 PROCEDURE — 90837 PSYTX W PT 60 MINUTES: CPT | Performed by: PSYCHOLOGIST

## 2023-12-20 NOTE — PROGRESS NOTES
START TIME:  7 PM  END TIME:  8 PM      Diagnoses/Problems  RAKESH  OCD  Major Depressive Disorder, single episode, mild       Orders  Patient agreed to return for psychotherapy with this provider.     Individual Treatment Goals:    1. Eliminate OCD behaviors  2. Improve decision making ability- ACHIEVED  3. Improve ability to commit to decisions- ACHIEVED  4. Improve self-esteem    Note dictated with [employer] transcription software. Completed without full typed error editing and sent to avoid delay.         Chief Complaint    An interactive audio and video telecommunication system which permits real time communications between the patient (at the originating site) and provider (at the distant site) was utilized to provide this telehealth service.    Verbal consent was requested and obtained from CASSI MÉNDEZ      Past Medical History  Problems    · Patient denies significant medical history    Social History  Problems    · Never a smoker   · Occasional alcohol use    Mental Status Exam  No suicidal ideation nor intent.       Narrative    Telephone/Televideo Informed Consent for Psychotherapy was reviewed with the patient as follows:  There are potential benefits and risks of the use of telephone or video-conferencing that differ from in-person sessions. Specifically, the telephone or televideo system we are using may not be HIPAA compliant and may present limits to patient confidentiality. Confidentiality still applies for telepsychology services, and nobody will record the session without your permission. You agree to use the telephone or video-conferencing platform selected for our virtual sessions, and I will explain how to use it.  1) You need to use a webcam or smartphone during the session.  2) It is important that you be in a quiet, private space that is free of distractions (including cell phone or other devices) during the session.  3) It is important to use a secure internet connection rather  than public/free Wi-Fi.  4) It is important to be on time. If you need to cancel or change your tele-appointment, you must notify the psychologist in advance by phone or email.  5) We need a back-up plan (e.g., phone number where you can be reached) to restart the session or to reschedule it, in the event of technical problems.  6) We need a safety plan that includes at least one emergency contact and the closest emergency room to your location, in the event of a crisis situation.  7) If you are not an adult, we need the permission of your parent or legal guardian (and their contact information) for you to participate in telepsychology sessions.  Understanding and verbal agreement was attested to by the patient.    Patient was reached via video for today's session.  We spent today's session mostly discussing the patient's increasing confidence.  Patient said that he feels great about himself and we discussed the various reasons why.  He explained that he had a phone call with his boss and his boss's boss in which they discussed the overpayment over this last year to the patient.  Patient was able to negotiate fairly good terms on him repaying this money.  He said he was impressed with his ability to be assertive and be calm under pressure.  Patient credits a lot of this to him going into difficult conversations with the expectation that they are going to be tense or difficult conversations.  Patient said that knowing this going and helps with feelings of uncertainty.  Also, he used evaluate how well conversations and other situations went by how he felt or whether or not there was tension at all.  We agreed that this certainly set him up for failure in the past.  We talked about how these realistic expectations, practicing assertiveness, not avoiding anxiety but rather accepting it and pushing through it and finally viewing himself as someone who pushes through challenges and tends to figure things out generally has  been the most helpful.  We agreed to follow up on this some more in the next session.

## 2023-12-26 ENCOUNTER — TELEMEDICINE (OUTPATIENT)
Dept: BEHAVIORAL HEALTH | Facility: CLINIC | Age: 28
End: 2023-12-26
Payer: COMMERCIAL

## 2024-01-02 ENCOUNTER — TELEMEDICINE (OUTPATIENT)
Dept: BEHAVIORAL HEALTH | Facility: CLINIC | Age: 29
End: 2024-01-02
Payer: COMMERCIAL

## 2024-01-02 DIAGNOSIS — F41.1 GENERALIZED ANXIETY DISORDER: Primary | ICD-10-CM

## 2024-01-02 DIAGNOSIS — F42.2 MIXED OBSESSIONAL THOUGHTS AND ACTS: ICD-10-CM

## 2024-01-02 PROCEDURE — 90837 PSYTX W PT 60 MINUTES: CPT | Performed by: PSYCHOLOGIST

## 2024-01-02 NOTE — PROGRESS NOTES
START TIME:  4 PM  END TIME:  4:55 PM      Diagnoses/Problems  RAKESH  OCD  Major Depressive Disorder, single episode, mild       Orders  Patient agreed to return for psychotherapy with this provider.     Individual Treatment Goals:    1. Eliminate OCD behaviors  2. Improve decision making ability- ACHIEVED  3. Improve ability to commit to decisions- ACHIEVED  4. Improve self-esteem    Note dictated with [employer] transcription software. Completed without full typed error editing and sent to avoid delay.         Chief Complaint    An interactive audio and video telecommunication system which permits real time communications between the patient (at the originating site) and provider (at the distant site) was utilized to provide this telehealth service.    Verbal consent was requested and obtained from CASSI MÉNDEZ      Past Medical History  Problems    · Patient denies significant medical history    Social History  Problems    · Never a smoker   · Occasional alcohol use    Mental Status Exam  No suicidal ideation nor intent.       Narrative    Telephone/Televideo Informed Consent for Psychotherapy was reviewed with the patient as follows:  There are potential benefits and risks of the use of telephone or video-conferencing that differ from in-person sessions. Specifically, the telephone or televideo system we are using may not be HIPAA compliant and may present limits to patient confidentiality. Confidentiality still applies for telepsychology services, and nobody will record the session without your permission. You agree to use the telephone or video-conferencing platform selected for our virtual sessions, and I will explain how to use it.  1) You need to use a webcam or smartphone during the session.  2) It is important that you be in a quiet, private space that is free of distractions (including cell phone or other devices) during the session.  3) It is important to use a secure internet connection rather  "than public/free Wi-Fi.  4) It is important to be on time. If you need to cancel or change your tele-appointment, you must notify the psychologist in advance by phone or email.  5) We need a back-up plan (e.g., phone number where you can be reached) to restart the session or to reschedule it, in the event of technical problems.  6) We need a safety plan that includes at least one emergency contact and the closest emergency room to your location, in the event of a crisis situation.  7) If you are not an adult, we need the permission of your parent or legal guardian (and their contact information) for you to participate in telepsychology sessions.  Understanding and verbal agreement was attested to by the patient.    Patient was reached via video for today's session.  Patient reported that he has been doing fairly well lately.  He said he has not been as busy as he usually is because work is not busy this time of year generally.  He explained that he continues to feel very confident about himself and is not putting pressure on himself to date.  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  We agreed that this would be good for him to learn even more about business in a way that is least risky and with about having to use a lot of his time.  Patient said he has been able to not feel guilty about doing some things less because he is using that time productively in other ways but also because he is telling himself that his self-worth does not come from hard work.  He plans to focus more on socializing over the course of the next year.  We agreed to follow up on this some more in the next session.  "

## 2024-01-11 ENCOUNTER — APPOINTMENT (OUTPATIENT)
Dept: BEHAVIORAL HEALTH | Facility: CLINIC | Age: 29
End: 2024-01-11
Payer: COMMERCIAL

## 2024-01-18 ENCOUNTER — TELEMEDICINE (OUTPATIENT)
Dept: BEHAVIORAL HEALTH | Facility: CLINIC | Age: 29
End: 2024-01-18
Payer: COMMERCIAL

## 2024-01-18 DIAGNOSIS — F41.1 GENERALIZED ANXIETY DISORDER: Primary | ICD-10-CM

## 2024-01-18 PROCEDURE — 90834 PSYTX W PT 45 MINUTES: CPT | Performed by: PSYCHOLOGIST

## 2024-01-18 NOTE — PROGRESS NOTES
START TIME:  6:15 PM  END TIME:  7 PM      Diagnoses/Problems  RAKESH  OCD  Major Depressive Disorder, single episode, mild       Orders  Patient agreed to return for psychotherapy with this provider.     Individual Treatment Goals:    1. Eliminate OCD behaviors  2. Improve decision making ability- ACHIEVED  3. Improve ability to commit to decisions- ACHIEVED  4. Improve self-esteem    Note dictated with [employer] transcription software. Completed without full typed error editing and sent to avoid delay.         Chief Complaint    An interactive audio and video telecommunication system which permits real time communications between the patient (at the originating site) and provider (at the distant site) was utilized to provide this telehealth service.    Verbal consent was requested and obtained from CASSI MÉNDEZ      Past Medical History  Problems    · Patient denies significant medical history    Social History  Problems    · Never a smoker   · Occasional alcohol use    Mental Status Exam  No suicidal ideation nor intent.       Narrative    Telephone/Televideo Informed Consent for Psychotherapy was reviewed with the patient as follows:  There are potential benefits and risks of the use of telephone or video-conferencing that differ from in-person sessions. Specifically, the telephone or televideo system we are using may not be HIPAA compliant and may present limits to patient confidentiality. Confidentiality still applies for telepsychology services, and nobody will record the session without your permission. You agree to use the telephone or video-conferencing platform selected for our virtual sessions, and I will explain how to use it.  1) You need to use a webcam or smartphone during the session.  2) It is important that you be in a quiet, private space that is free of distractions (including cell phone or other devices) during the session.  3) It is important to use a secure internet connection rather  than public/free Wi-Fi.  4) It is important to be on time. If you need to cancel or change your tele-appointment, you must notify the psychologist in advance by phone or email.  5) We need a back-up plan (e.g., phone number where you can be reached) to restart the session or to reschedule it, in the event of technical problems.  6) We need a safety plan that includes at least one emergency contact and the closest emergency room to your location, in the event of a crisis situation.  7) If you are not an adult, we need the permission of your parent or legal guardian (and their contact information) for you to participate in telepsychology sessions.  Understanding and verbal agreement was attested to by the patient.    Patient was reached via video for today's session.  Patient reported that he has been feeling a bit down lately.  He stated that work has not been as busy and when he is not productive, he starts to feel negatively about himself.  He admitted to engaging in negative self-talk including calling himself lazy.  There is nothing he can really do about the slow start back at work this year.  We talked about laziness and whether or not it is useful to call himself lazy.  We agreed that it is not.  Rather, it is more useful for him to look at what got in the way of him doing something that he did not do and to change that in addition to recognizing that he does not always have to be productive to have self worth.  We talked about how he told his mother that he is getting a $25,000 bonus because of his performance last year at work.  She immediately became negative about this and told him not to tell anyone because they will feel bad about themselves.  She talked about how she did not make that much in 6 months last year and made it about her essentially.  Patient said that he felt terrible about it.  He said it also did not make him feel good about making them money as if he did not deserve it.  He started to  think that he did not work hard enough to earn it.  Patient said that he has always felt like he had to have a mountain of evidence to prove that he deserved something whether that is his hard work or something else.  Even then, he would get feedback from his family that he got portia.  We plan to follow up on this more in the next session.

## 2024-01-23 ENCOUNTER — TELEMEDICINE (OUTPATIENT)
Dept: BEHAVIORAL HEALTH | Facility: CLINIC | Age: 29
End: 2024-01-23
Payer: COMMERCIAL

## 2024-01-23 DIAGNOSIS — F42.2 MIXED OBSESSIONAL THOUGHTS AND ACTS: ICD-10-CM

## 2024-01-23 DIAGNOSIS — F41.1 GENERALIZED ANXIETY DISORDER: Primary | ICD-10-CM

## 2024-01-23 PROCEDURE — 90837 PSYTX W PT 60 MINUTES: CPT | Performed by: PSYCHOLOGIST

## 2024-01-24 NOTE — PROGRESS NOTES
START TIME:  8 PM  END TIME:  9 PM      Diagnoses/Problems  RAKESH  OCD  Major Depressive Disorder, single episode, mild       Orders  Patient agreed to return for psychotherapy with this provider.     Individual Treatment Goals:    1. Eliminate OCD behaviors  2. Improve decision making ability- ACHIEVED  3. Improve ability to commit to decisions- ACHIEVED  4. Improve self-esteem    Note dictated with [employer] transcription software. Completed without full typed error editing and sent to avoid delay.         Chief Complaint    An interactive audio and video telecommunication system which permits real time communications between the patient (at the originating site) and provider (at the distant site) was utilized to provide this telehealth service.    Verbal consent was requested and obtained from CASSI MÉNDEZ      Past Medical History  Problems    · Patient denies significant medical history    Social History  Problems    · Never a smoker   · Occasional alcohol use    Mental Status Exam  No suicidal ideation nor intent.       Narrative    Telephone/Televideo Informed Consent for Psychotherapy was reviewed with the patient as follows:  There are potential benefits and risks of the use of telephone or video-conferencing that differ from in-person sessions. Specifically, the telephone or televideo system we are using may not be HIPAA compliant and may present limits to patient confidentiality. Confidentiality still applies for telepsychology services, and nobody will record the session without your permission. You agree to use the telephone or video-conferencing platform selected for our virtual sessions, and I will explain how to use it.  1) You need to use a webcam or smartphone during the session.  2) It is important that you be in a quiet, private space that is free of distractions (including cell phone or other devices) during the session.  3) It is important to use a secure internet connection rather  "than public/free Wi-Fi.  4) It is important to be on time. If you need to cancel or change your tele-appointment, you must notify the psychologist in advance by phone or email.  5) We need a back-up plan (e.g., phone number where you can be reached) to restart the session or to reschedule it, in the event of technical problems.  6) We need a safety plan that includes at least one emergency contact and the closest emergency room to your location, in the event of a crisis situation.  7) If you are not an adult, we need the permission of your parent or legal guardian (and their contact information) for you to participate in telepsychology sessions.  Understanding and verbal agreement was attested to by the patient.    Patient was reached via video for today's session.  Patient reported that he has been doing fairly well lately.  We talked about anxiety and depression today.  Patient said that he is still struggling a bit with this more so than usual and he believes that is primarily due to the season change.  He admitted to being worried that he is falling backwards and will go back to being as anxious as he was when he was younger.  He also talked about how today was great because he was very busy and was able to \"practice\".  We discussed this idea of \"practice\" and I encouraged him to use the word \"exposure\" instead because practice somewhat implies that he needs to have a script essentially when he is communicating with others.  He used to do this when he was younger and he would focus a lot on saying things that would make him come off a certain way or make people feel a certain way rather than just speaking his mind and being himself.  I reminded him that he has been being himself and that has been working and there is no need to practice being oneself.  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  I encouraged him to not seek reassurance for how to interpret minor things that influential people in his life tell him because this gets him focused on the trees and we want him to look at the forest.  I also encouraged the patient expect to experience anxiety because it is a normal part of everyone's life and reminded him that anxiety disorders are different than anxiety.  We agreed to follow up on this more in the next session

## 2024-02-06 ENCOUNTER — TELEMEDICINE (OUTPATIENT)
Dept: BEHAVIORAL HEALTH | Facility: CLINIC | Age: 29
End: 2024-02-06
Payer: COMMERCIAL

## 2024-02-06 DIAGNOSIS — F41.1 GENERALIZED ANXIETY DISORDER: Primary | ICD-10-CM

## 2024-02-06 PROCEDURE — 1036F TOBACCO NON-USER: CPT | Performed by: PSYCHOLOGIST

## 2024-02-06 PROCEDURE — 90837 PSYTX W PT 60 MINUTES: CPT | Performed by: PSYCHOLOGIST

## 2024-02-06 NOTE — PROGRESS NOTES
START TIME:  5 PM  END TIME:  5:55 PM      Diagnoses/Problems  RAKESH  OCD  Major Depressive Disorder, single episode, mild       Orders  Patient agreed to return for psychotherapy with this provider.     Individual Treatment Goals:    1. Eliminate OCD behaviors  2. Improve decision making ability- ACHIEVED  3. Improve ability to commit to decisions- ACHIEVED  4. Improve self-esteem    Note dictated with [employer] transcription software. Completed without full typed error editing and sent to avoid delay.         Chief Complaint    An interactive audio and video telecommunication system which permits real time communications between the patient (at the originating site) and provider (at the distant site) was utilized to provide this telehealth service.    Verbal consent was requested and obtained from CASSI MÉNDEZ      Past Medical History  Problems    · Patient denies significant medical history    Social History  Problems    · Never a smoker   · Occasional alcohol use    Mental Status Exam  No suicidal ideation nor intent.       Narrative    Telephone/Televideo Informed Consent for Psychotherapy was reviewed with the patient as follows:  There are potential benefits and risks of the use of telephone or video-conferencing that differ from in-person sessions. Specifically, the telephone or televideo system we are using may not be HIPAA compliant and may present limits to patient confidentiality. Confidentiality still applies for telepsychology services, and nobody will record the session without your permission. You agree to use the telephone or video-conferencing platform selected for our virtual sessions, and I will explain how to use it.  1) You need to use a webcam or smartphone during the session.  2) It is important that you be in a quiet, private space that is free of distractions (including cell phone or other devices) during the session.  3) It is important to use a secure internet connection rather  than public/free Wi-Fi.  4) It is important to be on time. If you need to cancel or change your tele-appointment, you must notify the psychologist in advance by phone or email.  5) We need a back-up plan (e.g., phone number where you can be reached) to restart the session or to reschedule it, in the event of technical problems.  6) We need a safety plan that includes at least one emergency contact and the closest emergency room to your location, in the event of a crisis situation.  7) If you are not an adult, we need the permission of your parent or legal guardian (and their contact information) for you to participate in telepsychology sessions.  Understanding and verbal agreement was attested to by the patient.    Patient was reached via video for today's session.  Patient reported that he has been doing fairly well lately.  He said that he had a realization recently that the reason why he struggles so much when he does not have a lot to do is that it reminds him of how he used to operate when he was a kid with a lot of anxiety.  He said that he tried to regulate his anxiety levels by not doing too much.  Patient said he would worry that if he did anything else, he would get more anxious and more exhausted as a result.  Therefore, the patient focused primarily on just surviving the day with the limited energy that he had.  He said that he feels foolish for not noticing this sooner but this means a lot to him.  We had been primarily focused on the fact that being productive and busy makes him feel good and neglected to think about why not doing these things makes him feel poorly.  We ended the session by discussing rileyu.  Patient has expressed an interest in doing this for quite some time now.  He mentioned that it is somewhat difficult for him to schedule things in advance.  He did say that he is worried that if he were to sign up for jujitsu that he might be too stressed at the end of the day to go and do it.   We talked about how this is exactly how he used to approach things as a child and that this would be a good test for him.  We agreed to follow up on it some more in the next session and the patient agreed to find out more information this upcoming weekend.

## 2024-02-20 ENCOUNTER — TELEMEDICINE (OUTPATIENT)
Dept: BEHAVIORAL HEALTH | Facility: CLINIC | Age: 29
End: 2024-02-20
Payer: COMMERCIAL

## 2024-02-20 DIAGNOSIS — F41.1 GENERALIZED ANXIETY DISORDER: Primary | ICD-10-CM

## 2024-02-20 DIAGNOSIS — F42.2 MIXED OBSESSIONAL THOUGHTS AND ACTS: ICD-10-CM

## 2024-02-20 PROCEDURE — 1036F TOBACCO NON-USER: CPT | Performed by: PSYCHOLOGIST

## 2024-02-20 PROCEDURE — 90837 PSYTX W PT 60 MINUTES: CPT | Performed by: PSYCHOLOGIST

## 2024-02-20 NOTE — PROGRESS NOTES
START TIME:  5 PM  END TIME:  5:55 PM      Diagnoses/Problems  RAKESH  OCD  Major Depressive Disorder, single episode, mild       Orders  Patient agreed to return for psychotherapy with this provider.     Individual Treatment Goals:    1. Eliminate OCD behaviors  2. Improve decision making ability- ACHIEVED  3. Improve ability to commit to decisions- ACHIEVED  4. Improve self-esteem    Note dictated with [employer] transcription software. Completed without full typed error editing and sent to avoid delay.         Chief Complaint    An interactive audio and video telecommunication system which permits real time communications between the patient (at the originating site) and provider (at the distant site) was utilized to provide this telehealth service.    Verbal consent was requested and obtained from CASSI MÉNDEZ      Past Medical History  Problems    · Patient denies significant medical history    Social History  Problems    · Never a smoker   · Occasional alcohol use    Mental Status Exam  No suicidal ideation nor intent.       Narrative    Telephone/Televideo Informed Consent for Psychotherapy was reviewed with the patient as follows:  There are potential benefits and risks of the use of telephone or video-conferencing that differ from in-person sessions. Specifically, the telephone or televideo system we are using may not be HIPAA compliant and may present limits to patient confidentiality. Confidentiality still applies for telepsychology services, and nobody will record the session without your permission. You agree to use the telephone or video-conferencing platform selected for our virtual sessions, and I will explain how to use it.  1) You need to use a webcam or smartphone during the session.  2) It is important that you be in a quiet, private space that is free of distractions (including cell phone or other devices) during the session.  3) It is important to use a secure internet connection rather  "than public/free Wi-Fi.  4) It is important to be on time. If you need to cancel or change your tele-appointment, you must notify the psychologist in advance by phone or email.  5) We need a back-up plan (e.g., phone number where you can be reached) to restart the session or to reschedule it, in the event of technical problems.  6) We need a safety plan that includes at least one emergency contact and the closest emergency room to your location, in the event of a crisis situation.  7) If you are not an adult, we need the permission of your parent or legal guardian (and their contact information) for you to participate in telepsychology sessions.  Understanding and verbal agreement was attested to by the patient.    Patient was reached via video for today's session.  Patient reported that he wanted a very valuable lesson recently.  Patient explained that after our last session, things picked up significantly at work and he was extremely busy for about 2 weeks.  During this time, he did not exercise and was not sleeping well.  He said he kept pushing himself because he knows that at least in the past doing more things has helped him feel better.  Patient said that he was at a conference and this is when he realized he was actually burnt out.  Patient explained that there were several different people who came up to him concerned because he did not look good.  Patient's boss actually pulled them aside twice to talk to him about how he is feeling.  His boss told him that he looks \"sad.\"  Patient was somewhat nervous about people thinking he was anxious but he was not.  He said that he was defensive at first when his boss told him this but later he realized that he was actually completely burnt out and it was not the result of anxiety.  At first, the patient felt awful about the situation because he was expecting people to say that he looked good because he lost weight and has gotten in better shape lately but instead, " they were surprised that he was only turning 29 and were overall concerned about his health.  We discussed how this is a great less than the Ismael and how unfortunately we need to learn many lessons the hard way.  I talked about how you can prepare for anything as much as possible and know exactly what to do in any given situation and even if there are no other variables, you are very likely to still make mistakes.  Patient found this helpful because he tends to want to be perfect in situations.  He said he has been rethinking dating as well.  Patient said that he previously was thinking about dating in terms of falling behind and that he should be with someone rather than wanting to be in a close intimate relationship.  Now he is thinking of it more like the latter.  We plan to follow up on this some more in the next session.

## 2024-02-27 ENCOUNTER — TELEMEDICINE (OUTPATIENT)
Dept: BEHAVIORAL HEALTH | Facility: CLINIC | Age: 29
End: 2024-02-27
Payer: COMMERCIAL

## 2024-02-27 DIAGNOSIS — F41.1 GENERALIZED ANXIETY DISORDER: Primary | ICD-10-CM

## 2024-02-27 DIAGNOSIS — F42.2 MIXED OBSESSIONAL THOUGHTS AND ACTS: ICD-10-CM

## 2024-02-27 PROCEDURE — 1036F TOBACCO NON-USER: CPT | Performed by: PSYCHOLOGIST

## 2024-02-27 PROCEDURE — 90837 PSYTX W PT 60 MINUTES: CPT | Performed by: PSYCHOLOGIST
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
## 2024-03-01 DIAGNOSIS — F41.1 GENERALIZED ANXIETY DISORDER: ICD-10-CM

## 2024-03-01 RX ORDER — SERTRALINE HYDROCHLORIDE 100 MG/1
100 TABLET, FILM COATED ORAL DAILY
Qty: 90 TABLET | Refills: 0 | Status: SHIPPED | OUTPATIENT
Start: 2024-03-01 | End: 2024-06-05 | Stop reason: SDUPTHER

## 2024-03-12 ENCOUNTER — TELEMEDICINE (OUTPATIENT)
Dept: BEHAVIORAL HEALTH | Facility: CLINIC | Age: 29
End: 2024-03-12
Payer: COMMERCIAL

## 2024-03-12 DIAGNOSIS — F41.1 GENERALIZED ANXIETY DISORDER: Primary | ICD-10-CM

## 2024-03-12 DIAGNOSIS — F42.2 MIXED OBSESSIONAL THOUGHTS AND ACTS: ICD-10-CM

## 2024-03-12 PROCEDURE — 1036F TOBACCO NON-USER: CPT | Performed by: PSYCHOLOGIST

## 2024-03-12 PROCEDURE — 90837 PSYTX W PT 60 MINUTES: CPT | Performed by: PSYCHOLOGIST

## 2024-03-12 NOTE — PROGRESS NOTES
START TIME:  5 PM  END TIME:  5:55 PM      Diagnoses/Problems  RAKESH  OCD  Major Depressive Disorder, single episode, mild       Orders  Patient agreed to return for psychotherapy with this provider.     Individual Treatment Goals:    1. Eliminate OCD behaviors  2. Improve decision making ability- ACHIEVED  3. Improve ability to commit to decisions- ACHIEVED  4. Improve self-esteem    Note dictated with [employer] transcription software. Completed without full typed error editing and sent to avoid delay.         Chief Complaint    An interactive audio and video telecommunication system which permits real time communications between the patient (at the originating site) and provider (at the distant site) was utilized to provide this telehealth service.    Verbal consent was requested and obtained from CASSI MÉNDEZ      Past Medical History  Problems    · Patient denies significant medical history    Social History  Problems    · Never a smoker   · Occasional alcohol use    Mental Status Exam  No suicidal ideation nor intent.       Narrative    Telephone/Televideo Informed Consent for Psychotherapy was reviewed with the patient as follows:  There are potential benefits and risks of the use of telephone or video-conferencing that differ from in-person sessions. Specifically, the telephone or televideo system we are using may not be HIPAA compliant and may present limits to patient confidentiality. Confidentiality still applies for telepsychology services, and nobody will record the session without your permission. You agree to use the telephone or video-conferencing platform selected for our virtual sessions, and I will explain how to use it.  1) You need to use a webcam or smartphone during the session.  2) It is important that you be in a quiet, private space that is free of distractions (including cell phone or other devices) during the session.  3) It is important to use a secure internet connection rather  "than public/free Wi-Fi.  4) It is important to be on time. If you need to cancel or change your tele-appointment, you must notify the psychologist in advance by phone or email.  5) We need a back-up plan (e.g., phone number where you can be reached) to restart the session or to reschedule it, in the event of technical problems.  6) We need a safety plan that includes at least one emergency contact and the closest emergency room to your location, in the event of a crisis situation.  7) If you are not an adult, we need the permission of your parent or legal guardian (and their contact information) for you to participate in telepsychology sessions.  Understanding and verbal agreement was attested to by the patient.    Patient was reached via video for today's session.  Patient reported that he continues to work on his mindset around work and making sure that he does not burn himself out.  He did a good job with not working on something for work that we discussed in our last session the evening of her last session and putting it off in the morning.  Patient has been dating some more as well.  We talked about the worksheets that I emailed to him and we started working on 1 today around the thought, \"I am an adequate romantically and sexually.\"  We were able to talk about the evidence for and against this thought and plan to continue working on it next time.  "

## 2024-03-19 ENCOUNTER — TELEMEDICINE (OUTPATIENT)
Dept: BEHAVIORAL HEALTH | Facility: CLINIC | Age: 29
End: 2024-03-19
Payer: COMMERCIAL

## 2024-03-19 DIAGNOSIS — F41.1 GENERALIZED ANXIETY DISORDER: Primary | ICD-10-CM

## 2024-03-19 DIAGNOSIS — F42.2 MIXED OBSESSIONAL THOUGHTS AND ACTS: ICD-10-CM

## 2024-03-19 PROCEDURE — 90837 PSYTX W PT 60 MINUTES: CPT | Performed by: PSYCHOLOGIST

## 2024-03-19 PROCEDURE — 1036F TOBACCO NON-USER: CPT | Performed by: PSYCHOLOGIST

## 2024-03-19 NOTE — PROGRESS NOTES
START TIME:  5 PM  END TIME:  5:55 PM      Diagnoses/Problems  RAKESH  OCD  Major Depressive Disorder, single episode, mild       Orders  Patient agreed to return for psychotherapy with this provider.     Individual Treatment Goals:    1. Eliminate OCD behaviors  2. Improve decision making ability- ACHIEVED  3. Improve ability to commit to decisions- ACHIEVED  4. Improve self-esteem    Note dictated with [employer] transcription software. Completed without full typed error editing and sent to avoid delay.         Chief Complaint    An interactive audio and video telecommunication system which permits real time communications between the patient (at the originating site) and provider (at the distant site) was utilized to provide this telehealth service.    Verbal consent was requested and obtained from CASSI MÉNDEZ      Past Medical History  Problems    · Patient denies significant medical history    Social History  Problems    · Never a smoker   · Occasional alcohol use    Mental Status Exam  No suicidal ideation nor intent.       Narrative    Telephone/Televideo Informed Consent for Psychotherapy was reviewed with the patient as follows:  There are potential benefits and risks of the use of telephone or video-conferencing that differ from in-person sessions. Specifically, the telephone or televideo system we are using may not be HIPAA compliant and may present limits to patient confidentiality. Confidentiality still applies for telepsychology services, and nobody will record the session without your permission. You agree to use the telephone or video-conferencing platform selected for our virtual sessions, and I will explain how to use it.  1) You need to use a webcam or smartphone during the session.  2) It is important that you be in a quiet, private space that is free of distractions (including cell phone or other devices) during the session.  3) It is important to use a secure internet connection rather  than public/free Wi-Fi.  4) It is important to be on time. If you need to cancel or change your tele-appointment, you must notify the psychologist in advance by phone or email.  5) We need a back-up plan (e.g., phone number where you can be reached) to restart the session or to reschedule it, in the event of technical problems.  6) We need a safety plan that includes at least one emergency contact and the closest emergency room to your location, in the event of a crisis situation.  7) If you are not an adult, we need the permission of your parent or legal guardian (and their contact information) for you to participate in telepsychology sessions.  Understanding and verbal agreement was attested to by the patient.    Patient was reached via video for today's session.  Patient stated that he is starting to feel better but is still feeling rather sad.  He said that he has been struggling through work but is doing okay.  We spent today's session continuing to complete the worksheet that we started in the previous session about the thought that he is inadequate romantically and sexually.  Patient was able to recognize why this is a cognitive distortion and he came up with alternative thoughts to use instead.  I explained that these kinds of things take practice and the more he practices challenging this thought the better off he will be.  Patient found it especially useful to talk through things that he did not realize that he was not the only 1 who experienced this.  We agreed to continue discussing it in the next session.

## 2024-03-28 ENCOUNTER — TELEMEDICINE (OUTPATIENT)
Dept: BEHAVIORAL HEALTH | Facility: CLINIC | Age: 29
End: 2024-03-28
Payer: COMMERCIAL

## 2024-03-28 DIAGNOSIS — F42.2 MIXED OBSESSIONAL THOUGHTS AND ACTS: ICD-10-CM

## 2024-03-28 DIAGNOSIS — F41.1 GENERALIZED ANXIETY DISORDER: Primary | ICD-10-CM

## 2024-03-28 PROCEDURE — 90837 PSYTX W PT 60 MINUTES: CPT | Performed by: PSYCHOLOGIST

## 2024-03-28 NOTE — PROGRESS NOTES
START TIME:  6 PM  END TIME:  6:55 PM      Diagnoses/Problems  RAKESH  OCD  Major Depressive Disorder, single episode, mild       Orders  Patient agreed to return for psychotherapy with this provider.     Individual Treatment Goals:    1. Eliminate OCD behaviors  2. Improve decision making ability- ACHIEVED  3. Improve ability to commit to decisions- ACHIEVED  4. Improve self-esteem    Note dictated with [employer] transcription software. Completed without full typed error editing and sent to avoid delay.         Chief Complaint    An interactive audio and video telecommunication system which permits real time communications between the patient (at the originating site) and provider (at the distant site) was utilized to provide this telehealth service.    Verbal consent was requested and obtained from CASSI MÉNDEZ      Past Medical History  Problems    · Patient denies significant medical history    Social History  Problems    · Never a smoker   · Occasional alcohol use    Mental Status Exam  No suicidal ideation nor intent.       Narrative    Telephone/Televideo Informed Consent for Psychotherapy was reviewed with the patient as follows:  There are potential benefits and risks of the use of telephone or video-conferencing that differ from in-person sessions. Specifically, the telephone or televideo system we are using may not be HIPAA compliant and may present limits to patient confidentiality. Confidentiality still applies for telepsychology services, and nobody will record the session without your permission. You agree to use the telephone or video-conferencing platform selected for our virtual sessions, and I will explain how to use it.  1) You need to use a webcam or smartphone during the session.  2) It is important that you be in a quiet, private space that is free of distractions (including cell phone or other devices) during the session.  3) It is important to use a secure internet connection rather  "than public/free Wi-Fi.  4) It is important to be on time. If you need to cancel or change your tele-appointment, you must notify the psychologist in advance by phone or email.  5) We need a back-up plan (e.g., phone number where you can be reached) to restart the session or to reschedule it, in the event of technical problems.  6) We need a safety plan that includes at least one emergency contact and the closest emergency room to your location, in the event of a crisis situation.  7) If you are not an adult, we need the permission of your parent or legal guardian (and their contact information) for you to participate in telepsychology sessions.  Understanding and verbal agreement was attested to by the patient.    Patient was reached via video for today's session.  We spent the entirety of today's session discussing the patient's work.  He said that he has been very stressed about it lately.  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  This is in addition to the fact that he has to pay back about $17,000 in over payments from last year.  Patient is frustrated because he is doing very well and there is nothing he can do to change this situation.  He was very open and honest with his boss who seems to be trying to help out.  We talked about what he can do to help decrease his financial stress overall.  Patient of course was only focused on just working a lot more to make up for the deficit but rather than doing this I strongly encouraged him to take a very close look at his budget and see where he can cut expenses including his student loan debt.  We talked about how this will also essentially \"earn\" him more money just like working more would.  I encouraged the patient to try and still focused on not burning himself out, finishing work at 5 PM and attending to other areas of his life.  We plan to " follow up on this more in the next session.

## 2024-04-04 ENCOUNTER — TELEMEDICINE (OUTPATIENT)
Dept: BEHAVIORAL HEALTH | Facility: CLINIC | Age: 29
End: 2024-04-04
Payer: COMMERCIAL

## 2024-04-04 DIAGNOSIS — F42.2 MIXED OBSESSIONAL THOUGHTS AND ACTS: ICD-10-CM

## 2024-04-04 DIAGNOSIS — F41.1 GENERALIZED ANXIETY DISORDER: Primary | ICD-10-CM

## 2024-04-04 PROCEDURE — 90837 PSYTX W PT 60 MINUTES: CPT | Performed by: PSYCHOLOGIST

## 2024-04-05 NOTE — PROGRESS NOTES
START TIME:  7 PM  END TIME:  7:55 PM      Diagnoses/Problems  RAKESH  OCD  Major Depressive Disorder, single episode, mild       Orders  Patient agreed to return for psychotherapy with this provider.     Individual Treatment Goals:    1. Eliminate OCD behaviors  2. Improve decision making ability- ACHIEVED  3. Improve ability to commit to decisions- ACHIEVED  4. Improve self-esteem    Note dictated with [employer] transcription software. Completed without full typed error editing and sent to avoid delay.         Chief Complaint    An interactive audio and video telecommunication system which permits real time communications between the patient (at the originating site) and provider (at the distant site) was utilized to provide this telehealth service.    Verbal consent was requested and obtained from CASSI MÉNDEZ      Past Medical History  Problems    · Patient denies significant medical history    Social History  Problems    · Never a smoker   · Occasional alcohol use    Mental Status Exam  No suicidal ideation nor intent.       Narrative    Telephone/Televideo Informed Consent for Psychotherapy was reviewed with the patient as follows:  There are potential benefits and risks of the use of telephone or video-conferencing that differ from in-person sessions. Specifically, the telephone or televideo system we are using may not be HIPAA compliant and may present limits to patient confidentiality. Confidentiality still applies for telepsychology services, and nobody will record the session without your permission. You agree to use the telephone or video-conferencing platform selected for our virtual sessions, and I will explain how to use it.  1) You need to use a webcam or smartphone during the session.  2) It is important that you be in a quiet, private space that is free of distractions (including cell phone or other devices) during the session.  3) It is important to use a secure internet connection rather  "than public/free Wi-Fi.  4) It is important to be on time. If you need to cancel or change your tele-appointment, you must notify the psychologist in advance by phone or email.  5) We need a back-up plan (e.g., phone number where you can be reached) to restart the session or to reschedule it, in the event of technical problems.  6) We need a safety plan that includes at least one emergency contact and the closest emergency room to your location, in the event of a crisis situation.  7) If you are not an adult, we need the permission of your parent or legal guardian (and their contact information) for you to participate in telepsychology sessions.  Understanding and verbal agreement was attested to by the patient.    Patient was reached via video for today's session.  Patient reported that he has been doing much better than he was in our previous session.  He said that the issue regarding his sales numbers that we discussed in the previous session was actually resolved.  He said that his numbers were adjusted to reflect more accurately how well he is doing.  He said that he has not nearly as anxious about money now.  Patient feels like he will do just fine.  However, he is working a lot more.  We talked about how working more could affect him.  We discussed whether or not he is concerned about burnout.  It sounds like burnout may be less of a risk because he is enjoying what he is doing much more.  Patient explained that he has a different approach and focus at work.  He has been much more direct about getting the sale and going out and meeting with people which he enjoys.  He still plans to look at his finances little bit more so that he can better understand where he stands.  We ended the session talking about how often he does not feel \"worthy\" of setting himself up for success.  For example, he said that if he makes his bed in the morning he will feel uncomfortable and uneasy as a result.  He is unsure exactly " why.  There is certainly a part of him that just tends to want to make things more challenging for himself and doing things ahead of time can make it feel like less of a challenge.  We discussed how most people self-sabotaging 1 way or another for what ever reason.  However, being worthy of setting himself up for success is a different idea.  We plan to follow up on this more in the next session.

## 2024-04-18 ENCOUNTER — TELEMEDICINE (OUTPATIENT)
Dept: BEHAVIORAL HEALTH | Facility: CLINIC | Age: 29
End: 2024-04-18
Payer: COMMERCIAL

## 2024-04-18 DIAGNOSIS — F41.1 GENERALIZED ANXIETY DISORDER: Primary | ICD-10-CM

## 2024-04-18 DIAGNOSIS — F42.2 MIXED OBSESSIONAL THOUGHTS AND ACTS: ICD-10-CM

## 2024-04-18 PROCEDURE — 90837 PSYTX W PT 60 MINUTES: CPT | Performed by: PSYCHOLOGIST

## 2024-04-19 NOTE — PROGRESS NOTES
START TIME: 8:15 PM  END TIME: 9:15 PM      Diagnoses/Problems  RAKESH  OCD  Major Depressive Disorder, single episode, mild       Orders  Patient agreed to return for psychotherapy with this provider.     Individual Treatment Goals:    1. Eliminate OCD behaviors  2. Improve decision making ability- ACHIEVED  3. Improve ability to commit to decisions- ACHIEVED  4. Improve self-esteem    Note dictated with [employer] transcription software. Completed without full typed error editing and sent to avoid delay.         Chief Complaint    An interactive audio and video telecommunication system which permits real time communications between the patient (at the originating site) and provider (at the distant site) was utilized to provide this telehealth service.    Verbal consent was requested and obtained from CASSI MÉNDEZ      Past Medical History  Problems    · Patient denies significant medical history    Social History  Problems    · Never a smoker   · Occasional alcohol use    Mental Status Exam  No suicidal ideation nor intent.       Narrative    Telephone/Televideo Informed Consent for Psychotherapy was reviewed with the patient as follows:  There are potential benefits and risks of the use of telephone or video-conferencing that differ from in-person sessions. Specifically, the telephone or televideo system we are using may not be HIPAA compliant and may present limits to patient confidentiality. Confidentiality still applies for telepsychology services, and nobody will record the session without your permission. You agree to use the telephone or video-conferencing platform selected for our virtual sessions, and I will explain how to use it.  1) You need to use a webcam or smartphone during the session.  2) It is important that you be in a quiet, private space that is free of distractions (including cell phone or other devices) during the session.  3) It is important to use a secure internet connection  "rather than public/free Wi-Fi.  4) It is important to be on time. If you need to cancel or change your tele-appointment, you must notify the psychologist in advance by phone or email.  5) We need a back-up plan (e.g., phone number where you can be reached) to restart the session or to reschedule it, in the event of technical problems.  6) We need a safety plan that includes at least one emergency contact and the closest emergency room to your location, in the event of a crisis situation.  7) If you are not an adult, we need the permission of your parent or legal guardian (and their contact information) for you to participate in telepsychology sessions.  Understanding and verbal agreement was attested to by the patient.    Patient was reached via video for today's session.  Patient reported that he has been doing well lately.  He stated that he has been a bit overwhelmed with work.  We talked extensively about the idea that many people have when they are confronted with doing something that is outside of their character.  Patient said that he talked to his siblings about this and they all agreed that they had the same kind of thought process about not being the kind of person who does, \"A, B and C \" even if they want to do that.  Patient said he has been trying to override this.  I suggested that he intentionally do things that he feels like are outside of his character to get used to the idea of trying things that are different from the norm.  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  We plan to follow up on this some more in the next session.  "

## 2024-04-30 ENCOUNTER — TELEMEDICINE (OUTPATIENT)
Dept: BEHAVIORAL HEALTH | Facility: CLINIC | Age: 29
End: 2024-04-30
Payer: COMMERCIAL

## 2024-04-30 DIAGNOSIS — F41.1 GENERALIZED ANXIETY DISORDER: Primary | ICD-10-CM

## 2024-04-30 DIAGNOSIS — F42.2 MIXED OBSESSIONAL THOUGHTS AND ACTS: ICD-10-CM

## 2024-04-30 PROCEDURE — 90837 PSYTX W PT 60 MINUTES: CPT | Performed by: PSYCHOLOGIST

## 2024-05-01 NOTE — PROGRESS NOTES
START TIME: 8 PM  END TIME: 9 PM      Diagnoses/Problems  RAKESH  OCD  Major Depressive Disorder, single episode, mild       Orders  Patient agreed to return for psychotherapy with this provider.     Individual Treatment Goals:    1. Eliminate OCD behaviors  2. Improve decision making ability- ACHIEVED  3. Improve ability to commit to decisions- ACHIEVED  4. Improve self-esteem    Note dictated with [employer] transcription software. Completed without full typed error editing and sent to avoid delay.         Chief Complaint    An interactive audio and video telecommunication system which permits real time communications between the patient (at the originating site) and provider (at the distant site) was utilized to provide this telehealth service.    Verbal consent was requested and obtained from CASSI MÉNDEZ      Past Medical History  Problems    · Patient denies significant medical history    Social History  Problems    · Never a smoker   · Occasional alcohol use    Mental Status Exam  No suicidal ideation nor intent.       Narrative    Telephone/Televideo Informed Consent for Psychotherapy was reviewed with the patient as follows:  There are potential benefits and risks of the use of telephone or video-conferencing that differ from in-person sessions. Specifically, the telephone or televideo system we are using may not be HIPAA compliant and may present limits to patient confidentiality. Confidentiality still applies for telepsychology services, and nobody will record the session without your permission. You agree to use the telephone or video-conferencing platform selected for our virtual sessions, and I will explain how to use it.  1) You need to use a webcam or smartphone during the session.  2) It is important that you be in a quiet, private space that is free of distractions (including cell phone or other devices) during the session.  3) It is important to use a secure internet connection rather than  public/free Wi-Fi.  4) It is important to be on time. If you need to cancel or change your tele-appointment, you must notify the psychologist in advance by phone or email.  5) We need a back-up plan (e.g., phone number where you can be reached) to restart the session or to reschedule it, in the event of technical problems.  6) We need a safety plan that includes at least one emergency contact and the closest emergency room to your location, in the event of a crisis situation.  7) If you are not an adult, we need the permission of your parent or legal guardian (and their contact information) for you to participate in telepsychology sessions.  Understanding and verbal agreement was attested to by the patient.    Patient was reached via video for today's session.  We spent most of today's session discussing patient's plan to create more structure in his life.  He said that he thinks that he has a fairly rigid schedule that he will be able to get more things done and be able to focus on taking care of himself more.  Patient said that he has pretty much never had a very rigid schedule in his life.  He talked about our conversation in the previous session about how he often feels like he is doing something wrong if he is setting himself up for success in the future.  We agreed that this will certainly be a thought that comes up for him as he creates this schedule because that is exactly what he will be doing.  Patient thinks that he might struggle with it because it is going to make his life simpler.  We talked about how he can think about it as actually making his life more difficult because he is not used to doing it and the patient found this helpful.  We talked about how a schedule will allow him to have more firm boundaries.  He may actually get more work done and prevent burnout at the same time by ensuring that he is doing healthy things for himself.  Patient agreed to create a schedule for the next week, stick to  it and we plan to follow up on it more in the next session.

## 2024-05-07 ENCOUNTER — TELEMEDICINE (OUTPATIENT)
Dept: BEHAVIORAL HEALTH | Facility: CLINIC | Age: 29
End: 2024-05-07
Payer: COMMERCIAL

## 2024-05-07 DIAGNOSIS — F42.2 MIXED OBSESSIONAL THOUGHTS AND ACTS: Primary | ICD-10-CM

## 2024-05-07 DIAGNOSIS — F41.1 GENERALIZED ANXIETY DISORDER: ICD-10-CM

## 2024-05-07 PROCEDURE — 90837 PSYTX W PT 60 MINUTES: CPT | Performed by: PSYCHOLOGIST

## 2024-05-10 NOTE — PROGRESS NOTES
START TIME: 6 PM  END TIME: 7 PM      Diagnoses/Problems  RAKESH  OCD  Major Depressive Disorder, single episode, mild       Orders  Patient agreed to return for psychotherapy with this provider.     Individual Treatment Goals:    1. Eliminate OCD behaviors  2. Improve decision making ability- ACHIEVED  3. Improve ability to commit to decisions- ACHIEVED  4. Improve self-esteem    Note dictated with [employer] transcription software. Completed without full typed error editing and sent to avoid delay.         Chief Complaint    An interactive audio and video telecommunication system which permits real time communications between the patient (at the originating site) and provider (at the distant site) was utilized to provide this telehealth service.    Verbal consent was requested and obtained from CASSI MÉNDEZ      Past Medical History  Problems    · Patient denies significant medical history    Social History  Problems    · Never a smoker   · Occasional alcohol use    Mental Status Exam  No suicidal ideation nor intent.       Narrative    Telephone/Televideo Informed Consent for Psychotherapy was reviewed with the patient as follows:  There are potential benefits and risks of the use of telephone or video-conferencing that differ from in-person sessions. Specifically, the telephone or televideo system we are using may not be HIPAA compliant and may present limits to patient confidentiality. Confidentiality still applies for telepsychology services, and nobody will record the session without your permission. You agree to use the telephone or video-conferencing platform selected for our virtual sessions, and I will explain how to use it.  1) You need to use a webcam or smartphone during the session.  2) It is important that you be in a quiet, private space that is free of distractions (including cell phone or other devices) during the session.  3) It is important to use a secure internet connection rather than  public/free Wi-Fi.  4) It is important to be on time. If you need to cancel or change your tele-appointment, you must notify the psychologist in advance by phone or email.  5) We need a back-up plan (e.g., phone number where you can be reached) to restart the session or to reschedule it, in the event of technical problems.  6) We need a safety plan that includes at least one emergency contact and the closest emergency room to your location, in the event of a crisis situation.  7) If you are not an adult, we need the permission of your parent or legal guardian (and their contact information) for you to participate in telepsychology sessions.  Understanding and verbal agreement was attested to by the patient.    Patient was reached via video for today's session.  Patient reported that he was able to follow a fairly rigid schedule since our last session.  He said that things are going well with that.  However, he did notice that he has been failing all of his time and does not have a good sense of how much she can actually do in a day without getting burned out.  He said that he is currently feeling that way.  Patient explained all of the social events that he had planned recently and how he pushed himself to go through all of them.  We discussed how his approach to exercise and anxiety in the past was extremely beneficial.  In regards to exercise, he understands that muscle growth only occurs when he is pushed to failure.  The same has been true for his anxiety and it has been beneficial for him to push himself when he is feeling especially anxious.  As a result, he has overcome much of his anxiety.  However, he continues to still use this approach day-to-day which is causing him to feel burnt out.  We agreed that looking at it in this way is most beneficial in that we need to shift his focus away from continually pushing himself and instead have him stop and ask himself if he is pushing himself because he is anxious  and wants to overcome that or just because he feels like he should.  We agreed to follow up on it some more in the next session.

## 2024-05-14 ENCOUNTER — TELEMEDICINE (OUTPATIENT)
Dept: BEHAVIORAL HEALTH | Facility: CLINIC | Age: 29
End: 2024-05-14
Payer: COMMERCIAL

## 2024-05-14 DIAGNOSIS — F42.2 MIXED OBSESSIONAL THOUGHTS AND ACTS: Primary | ICD-10-CM

## 2024-05-14 DIAGNOSIS — F41.1 GENERALIZED ANXIETY DISORDER: ICD-10-CM

## 2024-05-14 PROCEDURE — 90837 PSYTX W PT 60 MINUTES: CPT | Performed by: PSYCHOLOGIST

## 2024-05-15 NOTE — PROGRESS NOTES
START TIME: 7 PM  END TIME: 8 PM      Diagnoses/Problems  RAKESH  OCD  Major Depressive Disorder, single episode, mild       Orders  Patient agreed to return for psychotherapy with this provider.     Individual Treatment Goals:    1. Eliminate OCD behaviors  2. Improve decision making ability- ACHIEVED  3. Improve ability to commit to decisions- ACHIEVED  4. Improve self-esteem    Note dictated with [employer] transcription software. Completed without full typed error editing and sent to avoid delay.         Chief Complaint    An interactive audio and video telecommunication system which permits real time communications between the patient (at the originating site) and provider (at the distant site) was utilized to provide this telehealth service.    Verbal consent was requested and obtained from CASSI MÉNDEZ      Past Medical History  Problems    · Patient denies significant medical history    Social History  Problems    · Never a smoker   · Occasional alcohol use    Mental Status Exam  No suicidal ideation nor intent.       Narrative    Telephone/Televideo Informed Consent for Psychotherapy was reviewed with the patient as follows:  There are potential benefits and risks of the use of telephone or video-conferencing that differ from in-person sessions. Specifically, the telephone or televideo system we are using may not be HIPAA compliant and may present limits to patient confidentiality. Confidentiality still applies for telepsychology services, and nobody will record the session without your permission. You agree to use the telephone or video-conferencing platform selected for our virtual sessions, and I will explain how to use it.  1) You need to use a webcam or smartphone during the session.  2) It is important that you be in a quiet, private space that is free of distractions (including cell phone or other devices) during the session.  3) It is important to use a secure internet connection rather than  public/free Wi-Fi.  4) It is important to be on time. If you need to cancel or change your tele-appointment, you must notify the psychologist in advance by phone or email.  5) We need a back-up plan (e.g., phone number where you can be reached) to restart the session or to reschedule it, in the event of technical problems.  6) We need a safety plan that includes at least one emergency contact and the closest emergency room to your location, in the event of a crisis situation.  7) If you are not an adult, we need the permission of your parent or legal guardian (and their contact information) for you to participate in telepsychology sessions.  Understanding and verbal agreement was attested to by the patient.    Patient was reached via video for today's session.  Patient was overwhelmed in today's session.  He said that he got sick again and I could tell by looking at him that he was not feeling well physically.  He stated that he has been really overwhelmed with work.  Patient reported that he is currently at 9% of AOP at his target is at least 60%.  Patient said that no one is ever below 60%.  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  I reminded him that this 9% is not at all an accurate reflection of his sales abilities given that the reason his numbers are this low has to do with things that are entirely out of his control and lost a business that has nothing to do with him.  Patient is understandably anxious about money because he is going to be making far less this year in addition to having to pay back and overpayment from last year.  However, he is making it worse for himself by catastrophizing, taking responsibility for things out of his control and potentially pushing himself too much to try and make up a deficit that is impossible to make up.  We agreed that he should not be checking his sales percentage numbers as much as he currently is again because it is  not an accurate reflection on him.  We talked about how he cannot control his thoughts and we agreed that he will certainly be continuing to blame himself and have negative thoughts about this but that if he is able to focus on maintaining healthy behavioral habits, he can overcome it impacting him too much.  We plan to follow up on this some more in the next session.

## 2024-05-21 ENCOUNTER — TELEMEDICINE (OUTPATIENT)
Dept: BEHAVIORAL HEALTH | Facility: CLINIC | Age: 29
End: 2024-05-21
Payer: COMMERCIAL

## 2024-05-21 DIAGNOSIS — F42.2 MIXED OBSESSIONAL THOUGHTS AND ACTS: Primary | ICD-10-CM

## 2024-05-21 DIAGNOSIS — F41.1 GENERALIZED ANXIETY DISORDER: ICD-10-CM

## 2024-05-21 PROCEDURE — 90837 PSYTX W PT 60 MINUTES: CPT | Performed by: PSYCHOLOGIST

## 2024-05-21 NOTE — PROGRESS NOTES
START TIME: 6 PM  END TIME: 7 PM      Diagnoses/Problems  RAKESH  OCD  Major Depressive Disorder, single episode, mild       Orders  Patient agreed to return for psychotherapy with this provider.     Individual Treatment Goals:    1. Eliminate OCD behaviors  2. Improve decision making ability- ACHIEVED  3. Improve ability to commit to decisions- ACHIEVED  4. Improve self-esteem    Note dictated with [employer] transcription software. Completed without full typed error editing and sent to avoid delay.         Chief Complaint    An interactive audio and video telecommunication system which permits real time communications between the patient (at the originating site) and provider (at the distant site) was utilized to provide this telehealth service.    Verbal consent was requested and obtained from CASSI MÉNDEZ      Past Medical History  Problems    · Patient denies significant medical history    Social History  Problems    · Never a smoker   · Occasional alcohol use    Mental Status Exam  No suicidal ideation nor intent.       Narrative    Telephone/Televideo Informed Consent for Psychotherapy was reviewed with the patient as follows:  There are potential benefits and risks of the use of telephone or video-conferencing that differ from in-person sessions. Specifically, the telephone or televideo system we are using may not be HIPAA compliant and may present limits to patient confidentiality. Confidentiality still applies for telepsychology services, and nobody will record the session without your permission. You agree to use the telephone or video-conferencing platform selected for our virtual sessions, and I will explain how to use it.  1) You need to use a webcam or smartphone during the session.  2) It is important that you be in a quiet, private space that is free of distractions (including cell phone or other devices) during the session.  3) It is important to use a secure internet connection rather than  public/free Wi-Fi.  4) It is important to be on time. If you need to cancel or change your tele-appointment, you must notify the psychologist in advance by phone or email.  5) We need a back-up plan (e.g., phone number where you can be reached) to restart the session or to reschedule it, in the event of technical problems.  6) We need a safety plan that includes at least one emergency contact and the closest emergency room to your location, in the event of a crisis situation.  7) If you are not an adult, we need the permission of your parent or legal guardian (and their contact information) for you to participate in telepsychology sessions.  Understanding and verbal agreement was attested to by the patient.    Patient was reached via video for today's session.  We spent today's session primarily discussing the patient's stress about work.  He said that he continues to find himself paying too much attention to his AOP numbers.  We talked about ways to get him away from looking at this.  Ultimately, we agreed that helping him focus on his favorite part of the job, building relationships and taking care of people, will result in better sales numbers and will reduce his overall stress at work.  Patient talked about a coworker who has a different approach than him to work that seems to be better for his work life balance.  We talked about the patient's tendency to keep going when he feels good until he does not feel good any longer and then paying the price on that the next day we agreed that balance in general in his life would be good including balancing his meals throughout the day rather than eating 1 large meal at the end of the day.  We agreed to follow up on this some more in the next session.

## 2024-05-28 ENCOUNTER — TELEMEDICINE (OUTPATIENT)
Dept: BEHAVIORAL HEALTH | Facility: CLINIC | Age: 29
End: 2024-05-28
Payer: COMMERCIAL

## 2024-05-28 DIAGNOSIS — F41.1 GENERALIZED ANXIETY DISORDER: Primary | ICD-10-CM

## 2024-05-28 DIAGNOSIS — F42.2 MIXED OBSESSIONAL THOUGHTS AND ACTS: ICD-10-CM

## 2024-05-28 PROCEDURE — 90837 PSYTX W PT 60 MINUTES: CPT | Performed by: PSYCHOLOGIST

## 2024-05-30 NOTE — PROGRESS NOTES
START TIME: 6 PM  END TIME: 7 PM      Diagnoses/Problems  RAKESH  OCD  Major Depressive Disorder, single episode, mild       Orders  Patient agreed to return for psychotherapy with this provider.     Individual Treatment Goals:    1. Eliminate OCD behaviors  2. Improve decision making ability- ACHIEVED  3. Improve ability to commit to decisions- ACHIEVED  4. Improve self-esteem    Note dictated with [employer] transcription software. Completed without full typed error editing and sent to avoid delay.         Chief Complaint    An interactive audio and video telecommunication system which permits real time communications between the patient (at the originating site) and provider (at the distant site) was utilized to provide this telehealth service.    Verbal consent was requested and obtained from CASSI MÉNDEZ      Past Medical History  Problems    · Patient denies significant medical history    Social History  Problems    · Never a smoker   · Occasional alcohol use    Mental Status Exam  No suicidal ideation nor intent.       Narrative    Telephone/Televideo Informed Consent for Psychotherapy was reviewed with the patient as follows:  There are potential benefits and risks of the use of telephone or video-conferencing that differ from in-person sessions. Specifically, the telephone or televideo system we are using may not be HIPAA compliant and may present limits to patient confidentiality. Confidentiality still applies for telepsychology services, and nobody will record the session without your permission. You agree to use the telephone or video-conferencing platform selected for our virtual sessions, and I will explain how to use it.  1) You need to use a webcam or smartphone during the session.  2) It is important that you be in a quiet, private space that is free of distractions (including cell phone or other devices) during the session.  3) It is important to use a secure internet connection rather than  public/free Wi-Fi.  4) It is important to be on time. If you need to cancel or change your tele-appointment, you must notify the psychologist in advance by phone or email.  5) We need a back-up plan (e.g., phone number where you can be reached) to restart the session or to reschedule it, in the event of technical problems.  6) We need a safety plan that includes at least one emergency contact and the closest emergency room to your location, in the event of a crisis situation.  7) If you are not an adult, we need the permission of your parent or legal guardian (and their contact information) for you to participate in telepsychology sessions.  Understanding and verbal agreement was attested to by the patient.    Patient was reached via video for today's session.  We spent most of today's session discussing trauma and how various situations in his life have shaped who he is today.  Patient said that he listened to a podcast recently about trauma and could relate to a lot of it.  He realized that he has significant fears of abandonment.  We talked about how his fears of abandonment drive a lot of what he does.  Patient said that it is a combination of him feeling like he is not good enough and also not wanting to be a burden on others which makes him work very hard on essentially trying to be perfect.  He said he often feels most comfortable in a relationship when there is a problem that he has to solve and is able to do so.  These problems mostly involve his partner and what she is going through and not something he is struggling with.  He does feel uncomfortable receiving help from others as he is not used to this and it makes him feel like a burden.  We talked about how him moving to [place] briefly and returning makes sense given this and also why it was especially difficult for him to go through breaking up and getting back together with his last long-term girlfriend.  We agreed that this is a very important  insight that he has come to and that it explains much better his feeling of feeling behind.  In other words, not being  and having children confirms to him somewhat that he is not good enough and having a wife and children would be confirmation that he is.  We talked about ways to address this issue which mostly involves exposure therapy (scheduling events in which he could be rejected) and talking about his childhood in order to process his emotions related to it.  We also plan to use these exposures as a way to address inevitable irrational thoughts which will come up before during and after these situations.  We plan to follow up on this some more in the next session

## 2024-06-04 ENCOUNTER — TELEMEDICINE (OUTPATIENT)
Dept: BEHAVIORAL HEALTH | Facility: CLINIC | Age: 29
End: 2024-06-04
Payer: COMMERCIAL

## 2024-06-04 DIAGNOSIS — F41.1 GENERALIZED ANXIETY DISORDER: Primary | ICD-10-CM

## 2024-06-04 DIAGNOSIS — F42.2 MIXED OBSESSIONAL THOUGHTS AND ACTS: ICD-10-CM

## 2024-06-04 PROCEDURE — 90837 PSYTX W PT 60 MINUTES: CPT | Performed by: PSYCHOLOGIST

## 2024-06-04 NOTE — PROGRESS NOTES
START TIME: 6 PM  END TIME: 7 PM      Diagnoses/Problems  RAKESH  OCD  Major Depressive Disorder, single episode, mild       Orders  Patient agreed to return for psychotherapy with this provider.     Individual Treatment Goals:    1. Eliminate OCD behaviors  2. Improve decision making ability- ACHIEVED  3. Improve ability to commit to decisions- ACHIEVED  4. Improve self-esteem    Note dictated with [email or web address] transcription software. Completed without full typed error editing and sent to avoid delay.         Chief Complaint    An interactive audio and video telecommunication system which permits real time communications between the patient (at the originating site) and provider (at the distant site) was utilized to provide this telehealth service.    Verbal consent was requested and obtained from CASSI MÉNDEZ      Past Medical History  Problems    · Patient denies significant medical history    Social History  Problems    · Never a smoker   · Occasional alcohol use    Mental Status Exam  No suicidal ideation nor intent.       Narrative    Telephone/Televideo Informed Consent for Psychotherapy was reviewed with the patient as follows:  There are potential benefits and risks of the use of telephone or video-conferencing that differ from in-person sessions. Specifically, the telephone or televideo system we are using may not be HIPAA compliant and may present limits to patient confidentiality. Confidentiality still applies for telepsychology services, and nobody will record the session without your permission. You agree to use the telephone or video-conferencing platform selected for our virtual sessions, and I will explain how to use it.  1) You need to use a webcam or smartphone during the session.  2) It is important that you be in a quiet, private space that is free of distractions (including cell phone or other devices) during the session.  3) It is important to use a secure internet connection rather than  public/free Wi-Fi.  4) It is important to be on time. If you need to cancel or change your tele-appointment, you must notify the psychologist in advance by phone or email.  5) We need a back-up plan (e.g., phone number where you can be reached) to restart the session or to reschedule it, in the event of technical problems.  6) We need a safety plan that includes at least one emergency contact and the closest emergency room to your location, in the event of a crisis situation.  7) If you are not an adult, we need the permission of your parent or legal guardian (and their contact information) for you to participate in telepsychology sessions.  Understanding and verbal agreement was attested to by the patient.    Patient was reached via video for today's session.  We spent the majority of today's session discussing events that occurred in his childhood.  Patient was tearful from time to time briefly during today's session.  He was certainly experiencing his emotions and I do not believe that he was actively/consciously trying to avoid or minimize them.  Patient talked about how at 1 point when he was in fifth grade and she was in seventh grade, his sister was suicidal and engaging in self-harm on a regular basis.  Patient said that his mother would tell his sister on multiple occasions to just go ahead and do it already (complete suicide).  Patient said that he frequently tried to tell his mother not to say that to his sister and that this is the reason why she is suicidal but his mother ignored him.  Patient said that he cannot talk to his mother about their childhood without her getting very angry and defensive.  Patient has a rather balanced view of his parents however which we agreed is great.  He said he sees them both as damaged individuals with undiagnosed mental health problems who are incapable of doing some things that are necessary as a parent.  He said that he does not hate them and in fact he loves them  very much.  Patient admitted that he hated his father growing up because his father did not do anything to protect him from their mother and caused more stress.  Patient spent quite a bit of time today talking about how he took over the father role for his younger brother who is 5 years younger than him.  Their relationship today has been negatively affected by this.  Patient said that he believes that his brother would blame him for some of the stress that he went through as a child.  Patient appears somewhat heartbroken by the idea that his brother sees him as more of the problem rather than someone who is trying to protect him we talked about how the patient feels being vulnerable with himself like this.  He is certainly not used to it but is glad that he is doing it.  Patient has been doing a bit of a better job not beating himself up as much.  He said that he is talking to himself as if he is a child and this has been helpful.  We agreed that he would start journaling some of these past events and attempt to process his feelings about them and better understand himself.  We agreed to follow up on this some more in the next session.

## 2024-06-05 ENCOUNTER — TELEMEDICINE (OUTPATIENT)
Dept: BEHAVIORAL HEALTH | Facility: CLINIC | Age: 29
End: 2024-06-05
Payer: COMMERCIAL

## 2024-06-05 DIAGNOSIS — G47.00 INSOMNIA, UNSPECIFIED TYPE: ICD-10-CM

## 2024-06-05 DIAGNOSIS — F41.1 GENERALIZED ANXIETY DISORDER: ICD-10-CM

## 2024-06-05 PROCEDURE — 1036F TOBACCO NON-USER: CPT | Performed by: PSYCHIATRY & NEUROLOGY

## 2024-06-05 PROCEDURE — 99214 OFFICE O/P EST MOD 30 MIN: CPT | Performed by: PSYCHIATRY & NEUROLOGY

## 2024-06-05 RX ORDER — SERTRALINE HYDROCHLORIDE 100 MG/1
100 TABLET, FILM COATED ORAL DAILY
Qty: 90 TABLET | Refills: 1 | Status: SHIPPED | OUTPATIENT
Start: 2024-06-05 | End: 2024-12-02

## 2024-06-05 RX ORDER — TIAGABINE HYDROCHLORIDE 4 MG/1
8 TABLET, FILM COATED ORAL NIGHTLY
Qty: 180 TABLET | Refills: 1 | Status: SHIPPED | OUTPATIENT
Start: 2024-06-05 | End: 2024-12-02

## 2024-06-05 RX ORDER — TRAZODONE HYDROCHLORIDE 50 MG/1
50 TABLET ORAL NIGHTLY PRN
Qty: 90 TABLET | Refills: 1 | Status: SHIPPED | OUTPATIENT
Start: 2024-06-05 | End: 2024-12-02

## 2024-06-05 ASSESSMENT — PATIENT HEALTH QUESTIONNAIRE - PHQ9
2. FEELING DOWN, DEPRESSED OR HOPELESS: SEVERAL DAYS
1. LITTLE INTEREST OR PLEASURE IN DOING THINGS: NOT AT ALL
SUM OF ALL RESPONSES TO PHQ9 QUESTIONS 1 AND 2: 1
10. IF YOU CHECKED OFF ANY PROBLEMS, HOW DIFFICULT HAVE THESE PROBLEMS MADE IT FOR YOU TO DO YOUR WORK, TAKE CARE OF THINGS AT HOME, OR GET ALONG WITH OTHER PEOPLE: SOMEWHAT DIFFICULT

## 2024-06-05 ASSESSMENT — ENCOUNTER SYMPTOMS
DYSPHORIC MOOD: 0
NERVOUS/ANXIOUS: 0
SLEEP DISTURBANCE: 0

## 2024-06-05 NOTE — PROGRESS NOTES
"Adult Ambulatory Psychiatry Progress Note      Assessment/Plan     Impression:  Marino Barba is a 29 y.o. male domiciled alone, employed by [employer] in sales who presents for follow up with [facility] and Insomnia. Reviewed notes and labs of other providers.      Plan:   RAKESH - sertraline 100mg daily, c/w gabatril 4mg BID, c/w med ed, psycho ed and supportive psychotherapy, f/u 6 months  Insomnia - trazodone 50mg qhs prn        Subjective   HPI:  Pt arrived on time. Mood \"ok\" since last session. He's been working with a therapist on past trauma. Going through that has affected his mood a little. Work is \"crazy\" lately. The rubber market is \"softening\" so there are fewer sales. Anxiety has been manageable. Sleeping ok. Appetite ok. Taking medicine as prescribed. Denies significant SE.           Review of Systems   Psychiatric/Behavioral:  Negative for dysphoric mood, sleep disturbance and suicidal ideas. The patient is not nervous/anxious.          Objective   Mental Status Exam:  General Appearance: Well groomed, appropriate eye contact  Attitude/Behavior: Cooperative  Motor: No psychomotor agitation or retardation, no tremor or other abnormal movements  Speech: Normal rate, volume, prosody  Mood: \"ok\"  Affect: Euthymic, full-range  Thought Process: Linear, goal directed  Thought Associations: No loosening of associations  Thought Content: Normal  Perception: No perceptual abnormalities noted  Insight: Intact  Judgement: Intact    Vitals:  There were no vitals filed for this visit.  5/9/24  /82 (BP Location: Left arm, Patient Position: Sitting, BP Cuff Size: Adult)  Pulse 69  Temp 97.8 °F (36.6 °C) (Tympanic)  Resp 16  Ht 5' 11\"  Wt 76.7 kg (169 lb)  SpO2 97%  BMI 23.57 kg/m²     Current Medications:  Current Outpatient Medications on File Prior to Visit   Medication Sig Dispense Refill    ammonium lactate (Lac-Hydrin) 12 % lotion 1 Application.      MINOXIDIL-FINASTERIDE TOP Apply topically. " MINOXIDIL 6%-FINASTERIDE0.3%      [DISCONTINUED] sertraline (Zoloft) 100 mg tablet TAKE 1 TABLET BY MOUTH EVERY DAY 90 tablet 0    [DISCONTINUED] tiaGABine (Gabitril) 4 mg tablet Take 2 tablets (8 mg) by mouth once daily at bedtime. 180 tablet 1    [DISCONTINUED] traZODone (Desyrel) 50 mg tablet Take 1 tablet (50 mg) by mouth as needed at bedtime for sleep.       No current facility-administered medications on file prior to visit.       Orders:  Diagnoses and all orders for this visit:  Generalized anxiety disorder  -     sertraline (Zoloft) 100 mg tablet; Take 1 tablet (100 mg) by mouth once daily.  -     tiaGABine (Gabitril) 4 mg tablet; Take 2 tablets (8 mg) by mouth once daily at bedtime.  Insomnia, unspecified type  -     tiaGABine (Gabitril) 4 mg tablet; Take 2 tablets (8 mg) by mouth once daily at bedtime.  -     traZODone (Desyrel) 50 mg tablet; Take 1 tablet (50 mg) by mouth as needed at bedtime for sleep.          Next Appointment:  Follow up in 26 weeks (on 12/4/2024).

## 2024-06-11 ENCOUNTER — TELEMEDICINE (OUTPATIENT)
Dept: BEHAVIORAL HEALTH | Facility: CLINIC | Age: 29
End: 2024-06-11
Payer: COMMERCIAL

## 2024-06-11 DIAGNOSIS — F41.1 GENERALIZED ANXIETY DISORDER: Primary | ICD-10-CM

## 2024-06-11 DIAGNOSIS — G47.00 INSOMNIA, UNSPECIFIED TYPE: ICD-10-CM

## 2024-06-11 PROCEDURE — 90837 PSYTX W PT 60 MINUTES: CPT | Performed by: PSYCHOLOGIST

## 2024-06-12 NOTE — PROGRESS NOTES
START TIME: 6 PM  END TIME: 7 PM      Diagnoses/Problems  RAKESH  OCD  Major Depressive Disorder, single episode, mild       Orders  Patient agreed to return for psychotherapy with this provider.     Individual Treatment Goals:    1. Eliminate OCD behaviors  2. Improve decision making ability- ACHIEVED  3. Improve ability to commit to decisions- ACHIEVED  4. Improve self-esteem    Note dictated with [employer] transcription software. Completed without full typed error editing and sent to avoid delay.         Chief Complaint    An interactive audio and video telecommunication system which permits real time communications between the patient (at the originating site) and provider (at the distant site) was utilized to provide this telehealth service.    Verbal consent was requested and obtained from CASSI MÉNDEZ      Past Medical History  Problems    · Patient denies significant medical history    Social History  Problems    · Never a smoker   · Occasional alcohol use    Mental Status Exam  No suicidal ideation nor intent.       Narrative    Telephone/Televideo Informed Consent for Psychotherapy was reviewed with the patient as follows:  There are potential benefits and risks of the use of telephone or video-conferencing that differ from in-person sessions. Specifically, the telephone or televideo system we are using may not be HIPAA compliant and may present limits to patient confidentiality. Confidentiality still applies for telepsychology services, and nobody will record the session without your permission. You agree to use the telephone or video-conferencing platform selected for our virtual sessions, and I will explain how to use it.  1) You need to use a webcam or smartphone during the session.  2) It is important that you be in a quiet, private space that is free of distractions (including cell phone or other devices) during the session.  3) It is important to use a secure internet connection rather than  "public/free Wi-Fi.  4) It is important to be on time. If you need to cancel or change your tele-appointment, you must notify the psychologist in advance by phone or email.  5) We need a back-up plan (e.g., phone number where you can be reached) to restart the session or to reschedule it, in the event of technical problems.  6) We need a safety plan that includes at least one emergency contact and the closest emergency room to your location, in the event of a crisis situation.  7) If you are not an adult, we need the permission of your parent or legal guardian (and their contact information) for you to participate in telepsychology sessions.  Understanding and verbal agreement was attested to by the patient.    Patient was reached via video for today's session.  Patient reported that he has been feeling tired lately but the most part has been doing well.  We started by talking about work briefly.  He said that things are going okay with this.  Patient later made a comment about work at the end of the session about an older man seeming to flirt with him.  Patient said that he feels like this man could be a \"talker.\"  He was asking about where he lived and ended up overhearing one of the patient's friends tell someone else where the patient lives.  Patient said he has been freaking out a little bit.  We then continued by talking about his childhood some more.  Patient discussed how happy he was up until about age 7.  Patient said he had a good group of friends almost always going to sleep with a smile on his face.,  He struggled with significant anxiety and depression daily until high school.  Patient said that he started getting interested in motivational speakers and other strong role models.  Many of these men to use tactics related to being hard on oneself and he started to adopt this when he was thinking about the person he was prior to high school.  He said he saw the person that he was before high school as " someone very weak and he was ashamed of that person that he was.  It sounds like the patient has started to make significant progress since beginning to accept the fact that he was that person and to be able to empathize with that person.  Patient mentioned feeling disappointed that he has not learned these things about himself earlier.  We talked about how he was not in a position to learn these things at that time.  Patient agreed that he never would have considered therapy when he was in high school and would have seen it as something that weak people do.  We agreed to follow up on this some more in the next session.

## 2024-06-25 ENCOUNTER — APPOINTMENT (OUTPATIENT)
Dept: BEHAVIORAL HEALTH | Facility: CLINIC | Age: 29
End: 2024-06-25
Payer: COMMERCIAL

## 2024-06-25 DIAGNOSIS — F41.1 GENERALIZED ANXIETY DISORDER: Primary | ICD-10-CM

## 2024-06-25 DIAGNOSIS — F42.2 MIXED OBSESSIONAL THOUGHTS AND ACTS: ICD-10-CM

## 2024-06-25 PROCEDURE — 90837 PSYTX W PT 60 MINUTES: CPT | Performed by: PSYCHOLOGIST

## 2024-06-25 NOTE — PROGRESS NOTES
START TIME: 5 PM  END TIME: 6 PM      Diagnoses/Problems  RAKESH  OCD  Major Depressive Disorder, single episode, mild       Orders  Patient agreed to return for psychotherapy with this provider.     Individual Treatment Goals:    1. Eliminate OCD behaviors  2. Improve decision making ability- ACHIEVED  3. Improve ability to commit to decisions- ACHIEVED  4. Improve self-esteem    Note dictated with [employer] transcription software. Completed without full typed error editing and sent to avoid delay.         Chief Complaint    An interactive audio and video telecommunication system which permits real time communications between the patient (at the originating site) and provider (at the distant site) was utilized to provide this telehealth service.    Verbal consent was requested and obtained from CASSI MÉNDEZ      Past Medical History  Problems    · Patient denies significant medical history    Social History  Problems    · Never a smoker   · Occasional alcohol use    Mental Status Exam  No suicidal ideation nor intent.       Narrative    Telephone/Televideo Informed Consent for Psychotherapy was reviewed with the patient as follows:  There are potential benefits and risks of the use of telephone or video-conferencing that differ from in-person sessions. Specifically, the telephone or televideo system we are using may not be HIPAA compliant and may present limits to patient confidentiality. Confidentiality still applies for telepsychology services, and nobody will record the session without your permission. You agree to use the telephone or video-conferencing platform selected for our virtual sessions, and I will explain how to use it.  1) You need to use a webcam or smartphone during the session.  2) It is important that you be in a quiet, private space that is free of distractions (including cell phone or other devices) during the session.  3) It is important to use a secure internet connection rather than  public/free Wi-Fi.  4) It is important to be on time. If you need to cancel or change your tele-appointment, you must notify the psychologist in advance by phone or email.  5) We need a back-up plan (e.g., phone number where you can be reached) to restart the session or to reschedule it, in the event of technical problems.  6) We need a safety plan that includes at least one emergency contact and the closest emergency room to your location, in the event of a crisis situation.  7) If you are not an adult, we need the permission of your parent or legal guardian (and their contact information) for you to participate in telepsychology sessions.  Understanding and verbal agreement was attested to by the patient.    Patient was reached via video for today's session.  Patient reported that he has been struggling lately.  He said he is feeling burnt out again.  Patient said that things have been very busy at work.  We talked about his concerns about being fired and his concerns about his performance.  Patient continues to struggle with these thoughts despite having quite a bit of evidence to the contrary.  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  He stated that he actually would be doing better than expected and better than on average if not for 2 things that are out of his control.  We agreed that it makes no sense to look at those things that are out of his control and incorporate that into his sense of self at work.  Patient wrote down all of these things and agreed to put it in a location where he can easily see it.  We briefly talked about him possibly talking to his boss about his concerns but we ultimately agreed that the most healthy thing for him to do would be to reassure himself because he does  have ample evidence against his worried thoughts.  We agreed to follow up on this some more in the next session.

## 2024-07-02 ENCOUNTER — APPOINTMENT (OUTPATIENT)
Dept: BEHAVIORAL HEALTH | Facility: CLINIC | Age: 29
End: 2024-07-02
Payer: COMMERCIAL

## 2024-07-02 DIAGNOSIS — F42.2 MIXED OBSESSIONAL THOUGHTS AND ACTS: ICD-10-CM

## 2024-07-02 DIAGNOSIS — F41.1 GENERALIZED ANXIETY DISORDER: Primary | ICD-10-CM

## 2024-07-02 PROCEDURE — 90837 PSYTX W PT 60 MINUTES: CPT | Performed by: PSYCHOLOGIST

## 2024-07-02 NOTE — PROGRESS NOTES
START TIME: 7 PM  END TIME: 7:55 PM      Diagnoses/Problems  RAKESH  OCD  Major Depressive Disorder, single episode, mild       Orders  Patient agreed to return for psychotherapy with this provider.     Individual Treatment Goals:    1. Eliminate OCD behaviors  2. Improve decision making ability- ACHIEVED  3. Improve ability to commit to decisions- ACHIEVED  4. Improve self-esteem    Note dictated with [employer] transcription software. Completed without full typed error editing and sent to avoid delay.         Chief Complaint    An interactive audio and video telecommunication system which permits real time communications between the patient (at the originating site) and provider (at the distant site) was utilized to provide this telehealth service.    Verbal consent was requested and obtained from CASSI MÉNDEZ      Past Medical History  Problems    · Patient denies significant medical history    Social History  Problems    · Never a smoker   · Occasional alcohol use    Mental Status Exam  No suicidal ideation nor intent.       Narrative    Telephone/Televideo Informed Consent for Psychotherapy was reviewed with the patient as follows:  There are potential benefits and risks of the use of telephone or video-conferencing that differ from in-person sessions. Specifically, the telephone or televideo system we are using may not be HIPAA compliant and may present limits to patient confidentiality. Confidentiality still applies for telepsychology services, and nobody will record the session without your permission. You agree to use the telephone or video-conferencing platform selected for our virtual sessions, and I will explain how to use it.  1) You need to use a webcam or smartphone during the session.  2) It is important that you be in a quiet, private space that is free of distractions (including cell phone or other devices) during the session.  3) It is important to use a secure internet connection rather  than public/free Wi-Fi.  4) It is important to be on time. If you need to cancel or change your tele-appointment, you must notify the psychologist in advance by phone or email.  5) We need a back-up plan (e.g., phone number where you can be reached) to restart the session or to reschedule it, in the event of technical problems.  6) We need a safety plan that includes at least one emergency contact and the closest emergency room to your location, in the event of a crisis situation.  7) If you are not an adult, we need the permission of your parent or legal guardian (and their contact information) for you to participate in telepsychology sessions.  Understanding and verbal agreement was attested to by the patient.    Patient was reached via video for today's session.  Patient reported that he has been doing a little bit better lately.  He said that he is working on figuring out when he is anxious or burnt out.  We talked about a few different things that could be indicators of this.  For example, if the patient is still working on something and is not feeling any better, this could be an indication that he is burnt out.  We also agreed that if he is taking a break and he is not feeling better, he may just be more anxious.  We used the caveat that not all anxiety should be eliminated by acting on it.  Patient said that he did not make a list of all the reasons why he is very unlikely to be fired.  We made this list again today.  Patient plans to put it somewhere where it can be easily seen.  Patient said that he has been anxious about having sex with his friend Jada.  Patient said that he is not interested in being with her.  He admitted to having an irrational fear of getting her pregnant even if he wears a condom.  Patient said that she told him that she is also on birth control but he does not trust that.  We talked about how unlikely it is to get her pregnant while wearing a condom, especially if she is on birth  control.  This led to a discussion about why he does not want to be with her.  It sounds like she is sweet and he is attracted to her.  We discussed the kinds of women that he is attracted to and they tend to treat him how he treats himself, which is not so great.  It sounds like Jada treats him more of how he should be treating himself and perhaps he feels like it is not as much of a challenge.  Patient agreed to think about the reasons why he does not actually want to be with her.  We plan to talk some more about this in the next session.

## 2024-07-09 ENCOUNTER — APPOINTMENT (OUTPATIENT)
Dept: BEHAVIORAL HEALTH | Facility: CLINIC | Age: 29
End: 2024-07-09
Payer: COMMERCIAL

## 2024-07-09 DIAGNOSIS — F41.1 GENERALIZED ANXIETY DISORDER: Primary | ICD-10-CM

## 2024-07-09 DIAGNOSIS — F42.2 MIXED OBSESSIONAL THOUGHTS AND ACTS: ICD-10-CM

## 2024-07-09 PROCEDURE — 90837 PSYTX W PT 60 MINUTES: CPT | Performed by: PSYCHOLOGIST

## 2024-07-09 NOTE — PROGRESS NOTES
START TIME: 5 PM  END TIME: 5:55 PM      Diagnoses/Problems  RAKESH  OCD  Major Depressive Disorder, single episode, mild       Orders  Patient agreed to return for psychotherapy with this provider.     Individual Treatment Goals:    1. Eliminate OCD behaviors  2. Improve decision making ability- ACHIEVED  3. Improve ability to commit to decisions- ACHIEVED  4. Improve self-esteem    Note dictated with [employer] transcription software. Completed without full typed error editing and sent to avoid delay.         Chief Complaint    An interactive audio and video telecommunication system which permits real time communications between the patient (at the originating site) and provider (at the distant site) was utilized to provide this telehealth service.    Verbal consent was requested and obtained from CASSI MÉNDEZ      Past Medical History  Problems    · Patient denies significant medical history    Social History  Problems    · Never a smoker   · Occasional alcohol use    Mental Status Exam  No suicidal ideation nor intent.       Narrative    Telephone/Televideo Informed Consent for Psychotherapy was reviewed with the patient as follows:  There are potential benefits and risks of the use of telephone or video-conferencing that differ from in-person sessions. Specifically, the telephone or televideo system we are using may not be HIPAA compliant and may present limits to patient confidentiality. Confidentiality still applies for telepsychology services, and nobody will record the session without your permission. You agree to use the telephone or video-conferencing platform selected for our virtual sessions, and I will explain how to use it.  1) You need to use a webcam or smartphone during the session.  2) It is important that you be in a quiet, private space that is free of distractions (including cell phone or other devices) during the session.  3) It is important to use a secure internet connection rather  "than public/free Wi-Fi.  4) It is important to be on time. If you need to cancel or change your tele-appointment, you must notify the psychologist in advance by phone or email.  5) We need a back-up plan (e.g., phone number where you can be reached) to restart the session or to reschedule it, in the event of technical problems.  6) We need a safety plan that includes at least one emergency contact and the closest emergency room to your location, in the event of a crisis situation.  7) If you are not an adult, we need the permission of your parent or legal guardian (and their contact information) for you to participate in telepsychology sessions.  Understanding and verbal agreement was attested to by the patient.    Patient was reached via video for today's session.  Patient reported that he has been doing fairly well lately.  He said that he has been on vacation with his family in [place].  He has several more days left on his trip.  Patient has been doing a lot of driving which has allowed him to be with his thoughts a bit more.  We talked about how ultimately the patient tries to avoid sitting with his thoughts and being in the present moment.  Patient and I discussed how his childhood influenced this significantly.  Patient felt like he always needed to plan for the future because he could not trust his parents to do so and he wanted to escape the situation.  In addition, the present moment was terrible.  Patient talked about how often he is being somewhat he is not.  By this, he means that if he is feeling more down or \"low energy\", he will act as if he does not and intentionally be energetic.  Patient said that part of this is because he wants people to walk away from interactions with him feeling positive about them.  He also fears being rejected.  Being an authentic serves many purposes for him.  For one, it allows him to possibly prevent being rejected and if he is rejected, he was not being himself " anyway so it does not matter.  Patient said that he has found her last several sessions to be very helpful as he is learning much more about himself and beginning to accept these qualities.  We agreed to follow up on this some more in the next session.

## 2024-07-16 ENCOUNTER — APPOINTMENT (OUTPATIENT)
Dept: BEHAVIORAL HEALTH | Facility: CLINIC | Age: 29
End: 2024-07-16
Payer: COMMERCIAL

## 2024-07-16 DIAGNOSIS — F41.1 GENERALIZED ANXIETY DISORDER: Primary | ICD-10-CM

## 2024-07-16 DIAGNOSIS — F42.2 MIXED OBSESSIONAL THOUGHTS AND ACTS: ICD-10-CM

## 2024-07-16 PROCEDURE — 90837 PSYTX W PT 60 MINUTES: CPT | Performed by: PSYCHOLOGIST

## 2024-07-16 NOTE — PROGRESS NOTES
START TIME: 5 PM  END TIME: 5:55 PM      Diagnoses/Problems  RAKESH  OCD  Major Depressive Disorder, single episode, mild       Orders  Patient agreed to return for psychotherapy with this provider.     Individual Treatment Goals:    1. Eliminate OCD behaviors  2. Improve decision making ability- ACHIEVED  3. Improve ability to commit to decisions- ACHIEVED  4. Improve self-esteem    Note dictated with [employer] transcription software. Completed without full typed error editing and sent to avoid delay.         Chief Complaint    An interactive audio and video telecommunication system which permits real time communications between the patient (at the originating site) and provider (at the distant site) was utilized to provide this telehealth service.    Verbal consent was requested and obtained from CASSI MÉNDEZ      Past Medical History  Problems    · Patient denies significant medical history    Social History  Problems    · Never a smoker   · Occasional alcohol use    Mental Status Exam  No suicidal ideation nor intent.       Narrative    Telephone/Televideo Informed Consent for Psychotherapy was reviewed with the patient as follows:  There are potential benefits and risks of the use of telephone or video-conferencing that differ from in-person sessions. Specifically, the telephone or televideo system we are using may not be HIPAA compliant and may present limits to patient confidentiality. Confidentiality still applies for telepsychology services, and nobody will record the session without your permission. You agree to use the telephone or video-conferencing platform selected for our virtual sessions, and I will explain how to use it.  1) You need to use a webcam or smartphone during the session.  2) It is important that you be in a quiet, private space that is free of distractions (including cell phone or other devices) during the session.  3) It is important to use a secure internet connection rather  than public/free Wi-Fi.  4) It is important to be on time. If you need to cancel or change your tele-appointment, you must notify the psychologist in advance by phone or email.  5) We need a back-up plan (e.g., phone number where you can be reached) to restart the session or to reschedule it, in the event of technical problems.  6) We need a safety plan that includes at least one emergency contact and the closest emergency room to your location, in the event of a crisis situation.  7) If you are not an adult, we need the permission of your parent or legal guardian (and their contact information) for you to participate in telepsychology sessions.  Understanding and verbal agreement was attested to by the patient.    Patient was reached via video for today's session.  Patient reported that he has been struggling today.  Yesterday was his first day back at work.  He said it was a slow day so he was not particularly stressed.  However, today was just as busy as most days he works.  Also, he has not been sleeping well.  Patient looked very tired today.  He said that he has been waking up around 4 AM and is unable to get back to sleep until about 6 AM.  He talked about how he feels like he is less resilient.  Patient mentioned being on vacation for about a week and feeling pretty good about himself and thinking that he could tolerate more work stress when he came back but it does not seem to be the case.  I reminded him that he has not been sleeping well and this is likely a reason why it is more difficult to get through the day today.  We talked about his tendency to wear a mask when he is in public.  Patient was able to practice taking off the mask somewhat while he was on vacation and he had good results with that.  We talked about what to do when he feels like he needs to wear a mask at work.  I agreed that there are certainly times when it comes to work where we have to put our emotions aside, however, there are ways  that he could possibly make himself feel better rather than focusing on making himself appear better.  We talked about some things that he could do in order to do this.  I encouraged the patient to try and stay as present as possible.  I also encouraged him to practice grounding techniques and meditation in order to accomplish this.  We agreed to follow up on it some more in the next session.

## 2024-07-23 ENCOUNTER — APPOINTMENT (OUTPATIENT)
Dept: BEHAVIORAL HEALTH | Facility: CLINIC | Age: 29
End: 2024-07-23
Payer: COMMERCIAL

## 2024-07-23 DIAGNOSIS — F42.2 MIXED OBSESSIONAL THOUGHTS AND ACTS: ICD-10-CM

## 2024-07-23 DIAGNOSIS — F41.1 GENERALIZED ANXIETY DISORDER: Primary | ICD-10-CM

## 2024-07-23 PROCEDURE — 90837 PSYTX W PT 60 MINUTES: CPT | Performed by: PSYCHOLOGIST

## 2024-07-23 NOTE — PROGRESS NOTES
START TIME: 5 PM  END TIME: 5:55 PM      Diagnoses/Problems  RAKESH  OCD  Major Depressive Disorder, single episode, mild       Orders  Patient agreed to return for psychotherapy with this provider.     Individual Treatment Goals:    1. Eliminate OCD behaviors  2. Improve decision making ability- ACHIEVED  3. Improve ability to commit to decisions- ACHIEVED  4. Improve self-esteem    Note dictated with [employer] transcription software. Completed without full typed error editing and sent to avoid delay.         Chief Complaint    An interactive audio and video telecommunication system which permits real time communications between the patient (at the originating site) and provider (at the distant site) was utilized to provide this telehealth service.    Verbal consent was requested and obtained from CASSI MÉNDEZ      Past Medical History  Problems    · Patient denies significant medical history    Social History  Problems    · Never a smoker   · Occasional alcohol use    Mental Status Exam  No suicidal ideation nor intent.       Narrative    Telephone/Televideo Informed Consent for Psychotherapy was reviewed with the patient as follows:  There are potential benefits and risks of the use of telephone or video-conferencing that differ from in-person sessions. Specifically, the telephone or televideo system we are using may not be HIPAA compliant and may present limits to patient confidentiality. Confidentiality still applies for telepsychology services, and nobody will record the session without your permission. You agree to use the telephone or video-conferencing platform selected for our virtual sessions, and I will explain how to use it.  1) You need to use a webcam or smartphone during the session.  2) It is important that you be in a quiet, private space that is free of distractions (including cell phone or other devices) during the session.  3) It is important to use a secure internet connection rather  than public/free Wi-Fi.  4) It is important to be on time. If you need to cancel or change your tele-appointment, you must notify the psychologist in advance by phone or email.  5) We need a back-up plan (e.g., phone number where you can be reached) to restart the session or to reschedule it, in the event of technical problems.  6) We need a safety plan that includes at least one emergency contact and the closest emergency room to your location, in the event of a crisis situation.  7) If you are not an adult, we need the permission of your parent or legal guardian (and their contact information) for you to participate in telepsychology sessions.  Understanding and verbal agreement was attested to by the patient.    Patient was reached via video for today's session.  Patient reported that things have been much better for him lately.  He said that he has been working on changing his outlook on the work and his life generally.  Patient said that a few conversations with people he works with have been very helpful.  Patient said that there is a laura Landrum, who works in [place].  Patient said that this man admits that he does not work hard at the job and that he is still successful.  Patient is honestly surprised he has not been fired yet.  We do not believe that he should model himself after this person but rather there is a lot that he can learn about the fact that much of his work is out of his control.  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  This is the other conversation that the patient was able to have with someone at work recently which reassured him.  He said he talked to his boss about this.  Patient was reassured  that there is no way he would be fired this year but that some of the pressure would be on next year for him to make up for the losses of this year.  Patient found this reasonable.  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  Patient had to remind him that it is literally impossible for him to close this Because he had nothing to do with it and simply cannot make up for the losses in sales that were out of his control.  Patient said that he feels a lot of freedom after talking to this person.  We agreed to follow up in the morning the next session.

## 2024-07-30 ENCOUNTER — APPOINTMENT (OUTPATIENT)
Dept: BEHAVIORAL HEALTH | Facility: CLINIC | Age: 29
End: 2024-07-30
Payer: COMMERCIAL

## 2024-07-30 DIAGNOSIS — F42.2 MIXED OBSESSIONAL THOUGHTS AND ACTS: ICD-10-CM

## 2024-07-30 DIAGNOSIS — F41.1 GENERALIZED ANXIETY DISORDER: Primary | ICD-10-CM

## 2024-07-30 PROCEDURE — 90837 PSYTX W PT 60 MINUTES: CPT | Performed by: PSYCHOLOGIST

## 2024-07-30 NOTE — PROGRESS NOTES
START TIME: 5 PM  END TIME: 5:55 PM      Diagnoses/Problems  RAKESH  OCD  Major Depressive Disorder, single episode, mild       Orders  Patient agreed to return for psychotherapy with this provider.     Individual Treatment Goals:    1. Eliminate OCD behaviors  2. Improve decision making ability- ACHIEVED  3. Improve ability to commit to decisions- ACHIEVED  4. Improve self-esteem    Note dictated with [employer] transcription software. Completed without full typed error editing and sent to avoid delay.         Chief Complaint    An interactive audio and video telecommunication system which permits real time communications between the patient (at the originating site) and provider (at the distant site) was utilized to provide this telehealth service.    Verbal consent was requested and obtained from CASSI MÉNDEZ      Past Medical History  Problems    · Patient denies significant medical history    Social History  Problems    · Never a smoker   · Occasional alcohol use    Mental Status Exam  No suicidal ideation nor intent.       Narrative    Telephone/Televideo Informed Consent for Psychotherapy was reviewed with the patient as follows:  There are potential benefits and risks of the use of telephone or video-conferencing that differ from in-person sessions. Specifically, the telephone or televideo system we are using may not be HIPAA compliant and may present limits to patient confidentiality. Confidentiality still applies for telepsychology services, and nobody will record the session without your permission. You agree to use the telephone or video-conferencing platform selected for our virtual sessions, and I will explain how to use it.  1) You need to use a webcam or smartphone during the session.  2) It is important that you be in a quiet, private space that is free of distractions (including cell phone or other devices) during the session.  3) It is important to use a secure internet connection rather  than public/free Wi-Fi.  4) It is important to be on time. If you need to cancel or change your tele-appointment, you must notify the psychologist in advance by phone or email.  5) We need a back-up plan (e.g., phone number where you can be reached) to restart the session or to reschedule it, in the event of technical problems.  6) We need a safety plan that includes at least one emergency contact and the closest emergency room to your location, in the event of a crisis situation.  7) If you are not an adult, we need the permission of your parent or legal guardian (and their contact information) for you to participate in telepsychology sessions.  Understanding and verbal agreement was attested to by the patient.    Patient was reached via video for today's session.  Patient stated that his sleep continues to be an issue.  He stated that he is going to bed around 10:30 PM.  He said that about half the time, he falls asleep by midnight.  Patient admitted to staying in the bed that whole time.  I discouraged him from doing this.  He said that he is still waking at 4 AM and is unable to get back to sleep until about 5 AM or a little bit after.  Patient is getting out of his bed at this time and doing something relaxing.  Patient said he has been extremely exhausted lately.  We both wondered if he could have COVID or mono.  Patient said that he has been putting off things at work over the last week or so because of this.  He feels bad about it and is somewhat anxious about it continuing in the future or perhaps getting worse.  Despite this, he said that his mental health has been great over the last week.  We agreed that putting things off is probably one of the reasons why his mental health has been better because he has been replacing it at least somewhat with self-care.  It also sounds like the patient is meeting himself where he is and not trying to just push through it.  He admitted to having the thought that he  should catch up on work tonight but quickly dismissed it because he is so exhausted.  We realized at the end of the session that he has a tendency to project into the future how he is feeling now.  We had not thought about it like this in the past and agreed to talk some more about it in the next session.

## 2024-08-08 ENCOUNTER — APPOINTMENT (OUTPATIENT)
Dept: BEHAVIORAL HEALTH | Facility: CLINIC | Age: 29
End: 2024-08-08
Payer: COMMERCIAL

## 2024-08-15 ENCOUNTER — APPOINTMENT (OUTPATIENT)
Dept: BEHAVIORAL HEALTH | Facility: CLINIC | Age: 29
End: 2024-08-15
Payer: COMMERCIAL

## 2024-08-22 ENCOUNTER — APPOINTMENT (OUTPATIENT)
Dept: BEHAVIORAL HEALTH | Facility: CLINIC | Age: 29
End: 2024-08-22
Payer: COMMERCIAL

## 2024-09-05 ENCOUNTER — TELEMEDICINE (OUTPATIENT)
Dept: BEHAVIORAL HEALTH | Facility: CLINIC | Age: 29
End: 2024-09-05
Payer: COMMERCIAL

## 2024-09-05 DIAGNOSIS — F41.1 GENERALIZED ANXIETY DISORDER: ICD-10-CM

## 2024-09-05 DIAGNOSIS — F42.2 MIXED OBSESSIONAL THOUGHTS AND ACTS: ICD-10-CM

## 2024-09-05 PROCEDURE — 90837 PSYTX W PT 60 MINUTES: CPT | Performed by: PSYCHOLOGIST

## 2024-09-05 NOTE — PROGRESS NOTES
START TIME: 7 PM  END TIME: 7:55 PM      Diagnoses/Problems  RAKESH  OCD  Major Depressive Disorder, single episode, mild       Orders  Patient agreed to return for psychotherapy with this provider.     Individual Treatment Goals:    1. Eliminate OCD behaviors  2. Improve decision making ability- ACHIEVED  3. Improve ability to commit to decisions- ACHIEVED  4. Improve self-esteem    Note dictated with [employer] transcription software. Completed without full typed error editing and sent to avoid delay.         Chief Complaint    An interactive audio and video telecommunication system which permits real time communications between the patient (at the originating site) and provider (at the distant site) was utilized to provide this telehealth service.    Verbal consent was requested and obtained from CASSI MÉNDEZ      Past Medical History  Problems    · Patient denies significant medical history    Social History  Problems    · Never a smoker   · Occasional alcohol use    Mental Status Exam  No suicidal ideation nor intent.       Narrative    Telephone/Televideo Informed Consent for Psychotherapy was reviewed with the patient as follows:  There are potential benefits and risks of the use of telephone or video-conferencing that differ from in-person sessions. Specifically, the telephone or televideo system we are using may not be HIPAA compliant and may present limits to patient confidentiality. Confidentiality still applies for telepsychology services, and nobody will record the session without your permission. You agree to use the telephone or video-conferencing platform selected for our virtual sessions, and I will explain how to use it.  1) You need to use a webcam or smartphone during the session.  2) It is important that you be in a quiet, private space that is free of distractions (including cell phone or other devices) during the session.  3) It is important to use a secure internet connection rather  "than public/free Wi-Fi.  4) It is important to be on time. If you need to cancel or change your tele-appointment, you must notify the psychologist in advance by phone or email.  5) We need a back-up plan (e.g., phone number where you can be reached) to restart the session or to reschedule it, in the event of technical problems.  6) We need a safety plan that includes at least one emergency contact and the closest emergency room to your location, in the event of a crisis situation.  7) If you are not an adult, we need the permission of your parent or legal guardian (and their contact information) for you to participate in telepsychology sessions.  Understanding and verbal agreement was attested to by the patient.    Patient was reached via video for today's session.  Patient reported that he has been doing okay lately.  He stated that he has found a new goal to focus on work.  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  He stated that he went on 30 sales calls in the last 2 days.  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  Patient is very likely to go on more than 60 calls for the end of next week.  We agreed that he should set a goal for himself so that he does not continue to push himself until he burns out.  He said that he feels good about this goal and although he has been very busy, it is not nearly as exhausting as he usually is at work.  We processed some of his feelings about this.  We then talked about dating.  We talked about the pros and cons of one of the women that he is speaking to now.  We also discussed how he could be looking for the perfect person and ended up single the rest of his life as a result because no one is perfect.  We also discussed how he tends to get when he is talking to or dating someone that he believes is \"out of his league.\"  Patient was able to talk about some of the things that he does not " necessarily like about the woman he is talking to now and admittedly most of it was rather superficial but we agreed that it is important for us to recognize these obstacles in order to work on the more.  We agreed to follow up on it more in the next session.

## 2024-09-06 ENCOUNTER — APPOINTMENT (OUTPATIENT)
Dept: BEHAVIORAL HEALTH | Facility: CLINIC | Age: 29
End: 2024-09-06
Payer: COMMERCIAL

## 2024-09-10 ENCOUNTER — APPOINTMENT (OUTPATIENT)
Dept: BEHAVIORAL HEALTH | Facility: CLINIC | Age: 29
End: 2024-09-10
Payer: COMMERCIAL

## 2024-09-12 ENCOUNTER — TELEMEDICINE (OUTPATIENT)
Dept: BEHAVIORAL HEALTH | Facility: CLINIC | Age: 29
End: 2024-09-12
Payer: COMMERCIAL

## 2024-09-12 DIAGNOSIS — F42.2 MIXED OBSESSIONAL THOUGHTS AND ACTS: ICD-10-CM

## 2024-09-12 DIAGNOSIS — F41.1 GENERALIZED ANXIETY DISORDER: Primary | ICD-10-CM

## 2024-09-12 PROCEDURE — 90837 PSYTX W PT 60 MINUTES: CPT | Performed by: PSYCHOLOGIST

## 2024-09-13 NOTE — PROGRESS NOTES
START TIME: 9 PM  END TIME: 9:55 PM      Diagnoses/Problems  RAKESH  OCD  Major Depressive Disorder, single episode, mild       Orders  Patient agreed to return for psychotherapy with this provider.     Individual Treatment Goals:    1. Eliminate OCD behaviors  2. Improve decision making ability- ACHIEVED  3. Improve ability to commit to decisions- ACHIEVED  4. Improve self-esteem    Note dictated with [employer] transcription software. Completed without full typed error editing and sent to avoid delay.         Chief Complaint    An interactive audio and video telecommunication system which permits real time communications between the patient (at the originating site) and provider (at the distant site) was utilized to provide this telehealth service.    Verbal consent was requested and obtained from CASSI MÉNDEZ      Past Medical History  Problems    · Patient denies significant medical history    Social History  Problems    · Never a smoker   · Occasional alcohol use    Mental Status Exam  No suicidal ideation nor intent.       Narrative    Telephone/Televideo Informed Consent for Psychotherapy was reviewed with the patient as follows:  There are potential benefits and risks of the use of telephone or video-conferencing that differ from in-person sessions. Specifically, the telephone or televideo system we are using may not be HIPAA compliant and may present limits to patient confidentiality. Confidentiality still applies for telepsychology services, and nobody will record the session without your permission. You agree to use the telephone or video-conferencing platform selected for our virtual sessions, and I will explain how to use it.  1) You need to use a webcam or smartphone during the session.  2) It is important that you be in a quiet, private space that is free of distractions (including cell phone or other devices) during the session.  3) It is important to use a secure internet connection rather  than public/free Wi-Fi.  4) It is important to be on time. If you need to cancel or change your tele-appointment, you must notify the psychologist in advance by phone or email.  5) We need a back-up plan (e.g., phone number where you can be reached) to restart the session or to reschedule it, in the event of technical problems.  6) We need a safety plan that includes at least one emergency contact and the closest emergency room to your location, in the event of a crisis situation.  7) If you are not an adult, we need the permission of your parent or legal guardian (and their contact information) for you to participate in telepsychology sessions.  Understanding and verbal agreement was attested to by the patient.    Patient was reached via video for today's session.  Patient reported that he has been struggling over the last few weeks.  We spent most of today's session discussing ways to decrease his stress at work.  Patient talked about how he seems to be going nonstop all day long and is overwhelmed by this at times.  He has a job in which he will never get everything that needs to get done during the day every day and he is not expected to either.  However, the patient tends to strive to get everything done anyway.  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  Therefore, we agreed that having the top sales numbers and getting everything done every day are not realistic goals whatsoever and that if he continues to focus on trying to achieve these goals, he will continue to get burnt out.  We also talked about ensuring that he puts limits on himself in regards to when he starts and stops working.  We talked about making to do lists and prioritizing them.  Patient agreed to start practicing this tomorrow.  He plans to write down for 5 things that he wants to accomplish tomorrow before he starts work.  We agreed that this  should make it a bit easier for him to get started on work.  He also plans to make sure that he is taking breaks and stopping at a certain time regardless of what he has gotten done.  By the end of the session, the patient felt much more capable of handling the situation and he seemed hopeful about things improving.  We agreed to follow up on an more in the next session.

## 2024-09-19 ENCOUNTER — APPOINTMENT (OUTPATIENT)
Dept: BEHAVIORAL HEALTH | Facility: CLINIC | Age: 29
End: 2024-09-19
Payer: COMMERCIAL

## 2024-09-19 DIAGNOSIS — F42.2 MIXED OBSESSIONAL THOUGHTS AND ACTS: ICD-10-CM

## 2024-09-19 DIAGNOSIS — F41.1 GENERALIZED ANXIETY DISORDER: Primary | ICD-10-CM

## 2024-09-19 PROCEDURE — 90837 PSYTX W PT 60 MINUTES: CPT | Performed by: PSYCHOLOGIST
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
## 2024-09-26 ENCOUNTER — APPOINTMENT (OUTPATIENT)
Dept: BEHAVIORAL HEALTH | Facility: CLINIC | Age: 29
End: 2024-09-26
Payer: COMMERCIAL

## 2024-09-26 DIAGNOSIS — F42.2 MIXED OBSESSIONAL THOUGHTS AND ACTS: ICD-10-CM

## 2024-09-26 DIAGNOSIS — F41.1 GENERALIZED ANXIETY DISORDER: Primary | ICD-10-CM

## 2024-09-26 PROCEDURE — 90837 PSYTX W PT 60 MINUTES: CPT | Performed by: PSYCHOLOGIST

## 2024-09-27 NOTE — PROGRESS NOTES
START TIME: 7 PM  END TIME: 7:55 PM      Diagnoses/Problems  RAKESH  OCD  Major Depressive Disorder, single episode, mild       Orders  Patient agreed to return for psychotherapy with this provider.     Individual Treatment Goals:    1. Eliminate OCD behaviors  2. Improve decision making ability- ACHIEVED  3. Improve ability to commit to decisions- ACHIEVED  4. Improve self-esteem    Note dictated with [employer] transcription software. Completed without full typed error editing and sent to avoid delay.         Chief Complaint    An interactive audio and video telecommunication system which permits real time communications between the patient (at the originating site) and provider (at the distant site) was utilized to provide this telehealth service.    Verbal consent was requested and obtained from CASSI MÉNDEZ      Past Medical History  Problems    · Patient denies significant medical history    Social History  Problems    · Never a smoker   · Occasional alcohol use    Mental Status Exam  No suicidal ideation nor intent.       Narrative    Telephone/Televideo Informed Consent for Psychotherapy was reviewed with the patient as follows:  There are potential benefits and risks of the use of telephone or video-conferencing that differ from in-person sessions. Specifically, the telephone or televideo system we are using may not be HIPAA compliant and may present limits to patient confidentiality. Confidentiality still applies for telepsychology services, and nobody will record the session without your permission. You agree to use the telephone or video-conferencing platform selected for our virtual sessions, and I will explain how to use it.  1) You need to use a webcam or smartphone during the session.  2) It is important that you be in a quiet, private space that is free of distractions (including cell phone or other devices) during the session.  3) It is important to use a secure internet connection rather  than public/free Wi-Fi.  4) It is important to be on time. If you need to cancel or change your tele-appointment, you must notify the psychologist in advance by phone or email.  5) We need a back-up plan (e.g., phone number where you can be reached) to restart the session or to reschedule it, in the event of technical problems.  6) We need a safety plan that includes at least one emergency contact and the closest emergency room to your location, in the event of a crisis situation.  7) If you are not an adult, we need the permission of your parent or legal guardian (and their contact information) for you to participate in telepsychology sessions.  Understanding and verbal agreement was attested to by the patient.    Patient was reached via video for today's session.  Patient reported that he has been doing fairly well lately.  He said he has been doing well at work, although he has not been able to bring himself to make a list of tasks to complete at the start of the day.  We agreed that it might be something that would work better after the work day is done, in preparation for the following day.  Therefore, we decided to work on it together in the session.  Patient made a list of around a dozen items that he wants to get done tomorrow.  We then estimated that it would take him about 4.5 hours to complete all the tasks which would leave him plenty of time to then complete tasks that come up during the day.  We also agreed that it would be good for him to focus on completing his list before addressing various tasks that come up during the day.  We then spent the remainder of the session discussing relationships and what is stopping him from officially dating Jada.  We agreed that there are several factors at play here including his belief that if he decides to start dating someone, especially at his age, that he better be sure that this is going to be his wife one day.  We agreed that this is not a good approach and  could lead him to being with someone who isn't great for him just because he is attracted to them or it could lead him to stay in a relationship longer than he should, which was certainly an issue in his relationship with Lorena.  We plan to follow up on all this in the next session.    Plan:  Continue individual psychotherapy

## 2024-10-03 ENCOUNTER — APPOINTMENT (OUTPATIENT)
Dept: BEHAVIORAL HEALTH | Facility: CLINIC | Age: 29
End: 2024-10-03
Payer: COMMERCIAL

## 2024-10-03 DIAGNOSIS — F42.2 MIXED OBSESSIONAL THOUGHTS AND ACTS: ICD-10-CM

## 2024-10-03 DIAGNOSIS — F41.1 GENERALIZED ANXIETY DISORDER: Primary | ICD-10-CM

## 2024-10-03 PROCEDURE — 90837 PSYTX W PT 60 MINUTES: CPT | Performed by: PSYCHOLOGIST

## 2024-10-03 NOTE — PROGRESS NOTES
START TIME: 6 PM  END TIME: 6:55 PM      Diagnoses/Problems  RAKESH  OCD  Major Depressive Disorder, single episode, mild       Orders  Patient agreed to return for psychotherapy with this provider.     Individual Treatment Goals:    1. Eliminate OCD behaviors  2. Improve decision making ability- ACHIEVED  3. Improve ability to commit to decisions- ACHIEVED  4. Improve self-esteem    Note dictated with [employer] transcription software. Completed without full typed error editing and sent to avoid delay.         Chief Complaint    An interactive audio and video telecommunication system which permits real time communications between the patient (at the originating site) and provider (at the distant site) was utilized to provide this telehealth service.    Verbal consent was requested and obtained from CASSI MÉNDEZ      Past Medical History  Problems    · Patient denies significant medical history    Social History  Problems    · Never a smoker   · Occasional alcohol use    Mental Status Exam  No suicidal ideation nor intent.       Narrative    Telephone/Televideo Informed Consent for Psychotherapy was reviewed with the patient as follows:  There are potential benefits and risks of the use of telephone or video-conferencing that differ from in-person sessions. Specifically, the telephone or televideo system we are using may not be HIPAA compliant and may present limits to patient confidentiality. Confidentiality still applies for telepsychology services, and nobody will record the session without your permission. You agree to use the telephone or video-conferencing platform selected for our virtual sessions, and I will explain how to use it.  1) You need to use a webcam or smartphone during the session.  2) It is important that you be in a quiet, private space that is free of distractions (including cell phone or other devices) during the session.  3) It is important to use a secure internet connection rather  than public/free Wi-Fi.  4) It is important to be on time. If you need to cancel or change your tele-appointment, you must notify the psychologist in advance by phone or email.  5) We need a back-up plan (e.g., phone number where you can be reached) to restart the session or to reschedule it, in the event of technical problems.  6) We need a safety plan that includes at least one emergency contact and the closest emergency room to your location, in the event of a crisis situation.  7) If you are not an adult, we need the permission of your parent or legal guardian (and their contact information) for you to participate in telepsychology sessions.  Understanding and verbal agreement was attested to by the patient.    Patient was reached via video for today's session.  Patient said he has been struggling lately.  He reported that work has been stressful.  Patient believes that they are going to start laying off people at the company.  Patient is somewhat interested in looking for jobs elsewhere.  He talked about going to their biggest competitor and it sounds like he could easily get a job with them because he is close with someone who left the company for them and is very well respected and liked by another person who is planning to leave the company for them.  We talked about the stress he has been putting on himself.  Patient said that he has been unable to get use out of making lists of things to do the night before work.  He said he just gets off track fairly easily due to incoming tasks and things like that.  Patient is very anxious about money right now.  He is frustrated with the company for overpaying him last year and is annoyed that he has to still take care of that.  Patient admitted near the end of the session that he has been drinking excessively.  He said that over the last 2-1/2 weeks or so with the exception of the last 2 days, he has been drinking at least 2-4 beers every night.  He also has not been  exercising.  Patient has less interest in exercising as well.  We talked about strategies to reduce or eliminate his drinking.  I recommended eliminating it altogether for now until he can get back into his healthier habits and perhaps try moderation.  I encouraged the patient to exercise again as this was his best way of coping in the past.  We agreed to follow up on this some more in the next session.  Plan:  Continue individual psychotherapy

## 2024-10-08 ENCOUNTER — APPOINTMENT (OUTPATIENT)
Dept: BEHAVIORAL HEALTH | Facility: CLINIC | Age: 29
End: 2024-10-08
Payer: COMMERCIAL

## 2024-10-08 DIAGNOSIS — F41.1 GENERALIZED ANXIETY DISORDER: Primary | ICD-10-CM

## 2024-10-08 PROCEDURE — 90837 PSYTX W PT 60 MINUTES: CPT | Performed by: PSYCHOLOGIST

## 2024-10-08 NOTE — PROGRESS NOTES
START TIME: 7 PM  END TIME: 7:55 PM      Diagnoses/Problems  RAKESH  OCD  Major Depressive Disorder, single episode, mild       Orders  Patient agreed to return for psychotherapy with this provider.     Individual Treatment Goals:    1. Eliminate OCD behaviors  2. Improve decision making ability- ACHIEVED  3. Improve ability to commit to decisions- ACHIEVED  4. Improve self-esteem    Note dictated with [employer] transcription software. Completed without full typed error editing and sent to avoid delay.         Chief Complaint    An interactive audio and video telecommunication system which permits real time communications between the patient (at the originating site) and provider (at the distant site) was utilized to provide this telehealth service.    Verbal consent was requested and obtained from CASSI MÉNDEZ      Past Medical History  Problems    · Patient denies significant medical history    Social History  Problems    · Never a smoker   · Occasional alcohol use    Mental Status Exam  No suicidal ideation nor intent.       Narrative    Telephone/Televideo Informed Consent for Psychotherapy was reviewed with the patient as follows:  There are potential benefits and risks of the use of telephone or video-conferencing that differ from in-person sessions. Specifically, the telephone or televideo system we are using may not be HIPAA compliant and may present limits to patient confidentiality. Confidentiality still applies for telepsychology services, and nobody will record the session without your permission. You agree to use the telephone or video-conferencing platform selected for our virtual sessions, and I will explain how to use it.  1) You need to use a webcam or smartphone during the session.  2) It is important that you be in a quiet, private space that is free of distractions (including cell phone or other devices) during the session.  3) It is important to use a secure internet connection rather  than public/free Wi-Fi.  4) It is important to be on time. If you need to cancel or change your tele-appointment, you must notify the psychologist in advance by phone or email.  5) We need a back-up plan (e.g., phone number where you can be reached) to restart the session or to reschedule it, in the event of technical problems.  6) We need a safety plan that includes at least one emergency contact and the closest emergency room to your location, in the event of a crisis situation.  7) If you are not an adult, we need the permission of your parent or legal guardian (and their contact information) for you to participate in telepsychology sessions.  Understanding and verbal agreement was attested to by the patient.    Patient was reached via video for today's session.  Patient reported that he got a  for the issues related to his apartment.  This gave him more peace of mind because he knows what he needs to do going forward.  Patient said that he has been doing a little bit better with drinking and aside from having 3 beers on Friday, he has not drank.  Patient said that he is taking off work Friday and Monday because he is going out of town this weekend.  We talked about his tendency to compare himself to others.  Patient admitted that he rarely compares himself to himself and often does not spend time celebrating his achievements.  Patient often wants to improve himself every day and in doing so it is easy for him to see himself as flawed every day he is paying attention to things that he wants to improve about himself.  Patient said that often by the end of the day he will feel good about himself especially if he achieved his goals for that day but then the next day he wakes up and sees himself as highly flawed.  We agreed that it be good for him to remind himself daily of the things that he has accomplished in the past and how far he has come.  Patient agreed to write down some of these things and we will  talk more about them in the next session.  Plan:  Continue individual psychotherapy

## 2024-10-15 ENCOUNTER — APPOINTMENT (OUTPATIENT)
Dept: BEHAVIORAL HEALTH | Facility: CLINIC | Age: 29
End: 2024-10-15
Payer: COMMERCIAL

## 2024-10-15 DIAGNOSIS — F41.1 GENERALIZED ANXIETY DISORDER: Primary | ICD-10-CM

## 2024-10-15 DIAGNOSIS — F42.2 MIXED OBSESSIONAL THOUGHTS AND ACTS: ICD-10-CM

## 2024-10-15 PROCEDURE — 90837 PSYTX W PT 60 MINUTES: CPT | Performed by: PSYCHOLOGIST

## 2024-10-15 NOTE — PROGRESS NOTES
START TIME: 6 PM  END TIME: 6:55 PM      Diagnoses/Problems  RAKESH  OCD  Major Depressive Disorder, single episode, mild       Orders  Patient agreed to return for psychotherapy with this provider.     Individual Treatment Goals:    1. Eliminate OCD behaviors  2. Improve decision making ability- ACHIEVED  3. Improve ability to commit to decisions- ACHIEVED  4. Improve self-esteem    Note dictated with [employer] transcription software. Completed without full typed error editing and sent to avoid delay.         Chief Complaint    An interactive audio and video telecommunication system which permits real time communications between the patient (at the originating site) and provider (at the distant site) was utilized to provide this telehealth service.    Verbal consent was requested and obtained from CASSI MÉNDEZ      Past Medical History  Problems    · Patient denies significant medical history    Social History  Problems    · Never a smoker   · Occasional alcohol use    Mental Status Exam  No suicidal ideation nor intent.       Narrative    Telephone/Televideo Informed Consent for Psychotherapy was reviewed with the patient as follows:  There are potential benefits and risks of the use of telephone or video-conferencing that differ from in-person sessions. Specifically, the telephone or televideo system we are using may not be HIPAA compliant and may present limits to patient confidentiality. Confidentiality still applies for telepsychology services, and nobody will record the session without your permission. You agree to use the telephone or video-conferencing platform selected for our virtual sessions, and I will explain how to use it.  1) You need to use a webcam or smartphone during the session.  2) It is important that you be in a quiet, private space that is free of distractions (including cell phone or other devices) during the session.  3) It is important to use a secure internet connection rather  than public/free Wi-Fi.  4) It is important to be on time. If you need to cancel or change your tele-appointment, you must notify the psychologist in advance by phone or email.  5) We need a back-up plan (e.g., phone number where you can be reached) to restart the session or to reschedule it, in the event of technical problems.  6) We need a safety plan that includes at least one emergency contact and the closest emergency room to your location, in the event of a crisis situation.  7) If you are not an adult, we need the permission of your parent or legal guardian (and their contact information) for you to participate in telepsychology sessions.  Understanding and verbal agreement was attested to by the patient.    Patient was reached via video for today's session.  Today's session was primarily characterized by exploring the patient's core deeper issues.  We came to the conclusion that the patient feels like he needs to earn the right to feel good about himself and earned the right for others to like him.  We highlighted this by looking at the woman that he is currently talking to, Jada and 1 that he dated in the past, Manju.  Jada shows quite a bit of interest in him and the patient is unsure exactly why and is seemingly uninterested as a result of that.  In contrast, Manju did not show nearly as much interest in him and yet he tried much harder to win her approval by focusing entirely on what he thought she was looking for rather than being himself.  We agreed that it sounds like he feels that he has not earned Jada's interest in him and therefore feels that he does not deserve it.  Patient often feels like he needs to prove himself.  We talked about how he often feels great at the end of the day because he has accomplished things throughout the day but the next morning it is like he starts all over again.  We agreed that this is most likely because he is entirely basing his self worth on what he  "accomplishes and cannot feel good about himself until he \"turns it.\"  We agreed to follow up on this more in the next session.  Plan:  Continue individual psychotherapy  "

## 2024-10-29 ENCOUNTER — APPOINTMENT (OUTPATIENT)
Dept: BEHAVIORAL HEALTH | Facility: CLINIC | Age: 29
End: 2024-10-29
Payer: COMMERCIAL

## 2024-11-05 ENCOUNTER — APPOINTMENT (OUTPATIENT)
Dept: BEHAVIORAL HEALTH | Facility: CLINIC | Age: 29
End: 2024-11-05
Payer: COMMERCIAL

## 2024-11-05 DIAGNOSIS — F41.1 GENERALIZED ANXIETY DISORDER: Primary | ICD-10-CM

## 2024-11-05 DIAGNOSIS — F42.2 MIXED OBSESSIONAL THOUGHTS AND ACTS: ICD-10-CM

## 2024-11-05 PROCEDURE — 90834 PSYTX W PT 45 MINUTES: CPT | Performed by: PSYCHOLOGIST
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
## 2024-11-12 ENCOUNTER — APPOINTMENT (OUTPATIENT)
Dept: BEHAVIORAL HEALTH | Facility: CLINIC | Age: 29
End: 2024-11-12
Payer: COMMERCIAL

## 2024-11-12 DIAGNOSIS — F42.2 MIXED OBSESSIONAL THOUGHTS AND ACTS: ICD-10-CM

## 2024-11-12 DIAGNOSIS — F41.1 GENERALIZED ANXIETY DISORDER: Primary | ICD-10-CM

## 2024-11-12 PROCEDURE — 90837 PSYTX W PT 60 MINUTES: CPT | Performed by: PSYCHOLOGIST

## 2024-11-13 NOTE — PROGRESS NOTES
START TIME: 7 PM  END TIME: 8 PM      Diagnoses/Problems  RAKESH  OCD  Major Depressive Disorder, single episode, mild       Orders  Patient agreed to return for psychotherapy with this provider.     Individual Treatment Goals:    1. Eliminate OCD behaviors  2. Improve decision making ability- ACHIEVED  3. Improve ability to commit to decisions- ACHIEVED  4. Improve self-esteem    Note dictated with [employer] transcription software. Completed without full typed error editing and sent to avoid delay.         Chief Complaint    An interactive audio and video telecommunication system which permits real time communications between the patient (at the originating site) and provider (at the distant site) was utilized to provide this telehealth service.    Verbal consent was requested and obtained from CASSI MÉNDEZ      Past Medical History  Problems    · Patient denies significant medical history    Social History  Problems    · Never a smoker   · Occasional alcohol use    Mental Status Exam  No suicidal ideation nor intent.       Narrative    Telephone/Televideo Informed Consent for Psychotherapy was reviewed with the patient as follows:  There are potential benefits and risks of the use of telephone or video-conferencing that differ from in-person sessions. Specifically, the telephone or televideo system we are using may not be HIPAA compliant and may present limits to patient confidentiality. Confidentiality still applies for telepsychology services, and nobody will record the session without your permission. You agree to use the telephone or video-conferencing platform selected for our virtual sessions, and I will explain how to use it.  1) You need to use a webcam or smartphone during the session.  2) It is important that you be in a quiet, private space that is free of distractions (including cell phone or other devices) during the session.  3) It is important to use a secure internet connection rather than  "public/free Wi-Fi.  4) It is important to be on time. If you need to cancel or change your tele-appointment, you must notify the psychologist in advance by phone or email.  5) We need a back-up plan (e.g., phone number where you can be reached) to restart the session or to reschedule it, in the event of technical problems.  6) We need a safety plan that includes at least one emergency contact and the closest emergency room to your location, in the event of a crisis situation.  7) If you are not an adult, we need the permission of your parent or legal guardian (and their contact information) for you to participate in telepsychology sessions.  Understanding and verbal agreement was attested to by the patient.    Patient was reached via video for today's session.  We talked about the way that he speaks to himself today.  I pointed out that he often says, \"I have to\" or \"I need to,\" rather than \"I want to.\"  We agreed that this ends up making him dread doing these things that he feels he needs to do.  If he tells himself, \"I have been wanting to get this done,\" he feels like he would be more motivated and perhaps even excited about doing the thing.  We also talked about how he can make things that he needs to do and does not want to do more pleasurable.  We discussed how easy it is to talk ourselves out of doing something we do not want to do and how important it is to start doing something the second we start thinking about it.  We talked about what it means to be prepared for uncertainty and how it is not often as possible or necessary to do so.  I encouraged the patient to tell himself, \"I will figure it out,\" whenever he starts to worry about the future.  We agreed to follow up on these things some more in the next session.    Plan:  Continue individual psychotherapy  "

## 2024-11-19 ENCOUNTER — APPOINTMENT (OUTPATIENT)
Dept: BEHAVIORAL HEALTH | Facility: CLINIC | Age: 29
End: 2024-11-19
Payer: COMMERCIAL

## 2024-11-19 DIAGNOSIS — F42.2 MIXED OBSESSIONAL THOUGHTS AND ACTS: ICD-10-CM

## 2024-11-19 DIAGNOSIS — F41.1 GENERALIZED ANXIETY DISORDER: ICD-10-CM

## 2024-11-19 PROCEDURE — 90837 PSYTX W PT 60 MINUTES: CPT | Performed by: PSYCHOLOGIST

## 2024-11-20 NOTE — PROGRESS NOTES
START TIME: 7 PM  END TIME: 8 PM      Diagnoses/Problems  RAKESH  OCD  Major Depressive Disorder, single episode, mild       Orders  Patient agreed to return for psychotherapy with this provider.     Individual Treatment Goals:    1. Eliminate OCD behaviors  2. Improve decision making ability- ACHIEVED  3. Improve ability to commit to decisions- ACHIEVED  4. Improve self-esteem    Note dictated with [employer] transcription software. Completed without full typed error editing and sent to avoid delay.         Chief Complaint    An interactive audio and video telecommunication system which permits real time communications between the patient (at the originating site) and provider (at the distant site) was utilized to provide this telehealth service.    Verbal consent was requested and obtained from CASSI MÉNDEZ      Past Medical History  Problems    · Patient denies significant medical history    Social History  Problems    · Never a smoker   · Occasional alcohol use    Mental Status Exam  No suicidal ideation nor intent.       Narrative    Telephone/Televideo Informed Consent for Psychotherapy was reviewed with the patient as follows:  There are potential benefits and risks of the use of telephone or video-conferencing that differ from in-person sessions. Specifically, the telephone or televideo system we are using may not be HIPAA compliant and may present limits to patient confidentiality. Confidentiality still applies for telepsychology services, and nobody will record the session without your permission. You agree to use the telephone or video-conferencing platform selected for our virtual sessions, and I will explain how to use it.  1) You need to use a webcam or smartphone during the session.  2) It is important that you be in a quiet, private space that is free of distractions (including cell phone or other devices) during the session.  3) It is important to use a secure internet connection rather than  public/free Wi-Fi.  4) It is important to be on time. If you need to cancel or change your tele-appointment, you must notify the psychologist in advance by phone or email.  5) We need a back-up plan (e.g., phone number where you can be reached) to restart the session or to reschedule it, in the event of technical problems.  6) We need a safety plan that includes at least one emergency contact and the closest emergency room to your location, in the event of a crisis situation.  7) If you are not an adult, we need the permission of your parent or legal guardian (and their contact information) for you to participate in telepsychology sessions.  Understanding and verbal agreement was attested to by the patient.    Patient was reached via video for today's session.  Patient stated that he found our last session very helpful and has been changing how he talks to himself since then.  Patient said that he feels embarrassed because he had to ask his mother to borrow some money.  At the same time, the patient remembered that he said at the beginning of the year that he does not know how he is going to make it through the year financially and he has especially considering this the only time he has had to borrow money.  Patient will pay her back in a few days when he gets paid.  We talked a lot about his fear of commitment today.  We also discussed his fear of being alone.  We talked about how these are contradicting fears and that he has had difficulties with commitment in the past at least 2 women who were ready for commitment.  We talked about how he seems more interested in the women he dates when they have lost interest in him.  We discussed how perhaps this is why he is not interested in the girl that he is talking to now.  We agreed to follow up on this some more in the next session.    Plan:  Continue individual psychotherapy

## 2024-11-26 ENCOUNTER — APPOINTMENT (OUTPATIENT)
Dept: BEHAVIORAL HEALTH | Facility: CLINIC | Age: 29
End: 2024-11-26
Payer: COMMERCIAL

## 2024-11-26 DIAGNOSIS — F41.1 GENERALIZED ANXIETY DISORDER: Primary | ICD-10-CM

## 2024-11-26 PROCEDURE — 90837 PSYTX W PT 60 MINUTES: CPT | Performed by: PSYCHOLOGIST

## 2024-11-27 DIAGNOSIS — F41.1 GENERALIZED ANXIETY DISORDER: ICD-10-CM

## 2024-11-27 DIAGNOSIS — G47.00 INSOMNIA, UNSPECIFIED TYPE: ICD-10-CM

## 2024-11-27 RX ORDER — SERTRALINE HYDROCHLORIDE 100 MG/1
100 TABLET, FILM COATED ORAL DAILY
Qty: 90 TABLET | Refills: 1 | Status: SHIPPED | OUTPATIENT
Start: 2024-11-27

## 2024-11-27 RX ORDER — TRAZODONE HYDROCHLORIDE 50 MG/1
50 TABLET ORAL NIGHTLY PRN
Qty: 90 TABLET | Refills: 1 | Status: SHIPPED | OUTPATIENT
Start: 2024-11-27 | End: 2025-05-26

## 2024-11-27 NOTE — PROGRESS NOTES
START TIME: 7 PM  END TIME: 7:53 PM      Diagnoses/Problems  RAKESH  OCD  Major Depressive Disorder, single episode, mild       Orders  Patient agreed to return for psychotherapy with this provider.     Individual Treatment Goals:    1. Eliminate OCD behaviors  2. Improve decision making ability- ACHIEVED  3. Improve ability to commit to decisions- ACHIEVED  4. Improve self-esteem    Note dictated with [employer] transcription software. Completed without full typed error editing and sent to avoid delay.         Chief Complaint    An interactive audio and video telecommunication system which permits real time communications between the patient (at the originating site) and provider (at the distant site) was utilized to provide this telehealth service.    Verbal consent was requested and obtained from CASSI MÉNDEZ      Past Medical History  Problems    · Patient denies significant medical history    Social History  Problems    · Never a smoker   · Occasional alcohol use    Mental Status Exam  No suicidal ideation nor intent.       Narrative    Telephone/Televideo Informed Consent for Psychotherapy was reviewed with the patient as follows:  There are potential benefits and risks of the use of telephone or video-conferencing that differ from in-person sessions. Specifically, the telephone or televideo system we are using may not be HIPAA compliant and may present limits to patient confidentiality. Confidentiality still applies for telepsychology services, and nobody will record the session without your permission. You agree to use the telephone or video-conferencing platform selected for our virtual sessions, and I will explain how to use it.  1) You need to use a webcam or smartphone during the session.  2) It is important that you be in a quiet, private space that is free of distractions (including cell phone or other devices) during the session.  3) It is important to use a secure internet connection rather  than public/free Wi-Fi.  4) It is important to be on time. If you need to cancel or change your tele-appointment, you must notify the psychologist in advance by phone or email.  5) We need a back-up plan (e.g., phone number where you can be reached) to restart the session or to reschedule it, in the event of technical problems.  6) We need a safety plan that includes at least one emergency contact and the closest emergency room to your location, in the event of a crisis situation.  7) If you are not an adult, we need the permission of your parent or legal guardian (and their contact information) for you to participate in telepsychology sessions.  Understanding and verbal agreement was attested to by the patient.    Patient was reached via video for today's session.  Patient reported that he has been doing okay lately.  He said that he has been feeling burnt out but is unsure if this is more so because he feels like he should be burnt out.  Patient said he is very much looking forward to having a few days off soon.  Also, the slow season is starting soon and he will have little work to do the last several weeks of the year.  We talked about how far the patient is gone this year.  Patient was not sure at all if he could make it financially through the year and yet he has.  We talked about how it is true that it was very stressful and that he never wants to go through that again and it is also true that he did at this time and could do it again if he needed to.  I pointed out the important distinction between adding to the stress of going through it in the first place by telling himself that he cannot do it.  We also talked about dating.  Patient said that he is going to start dating again soon while still talking to Jada.  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  He is unsure how to approach it and is not completely clear on his boss's role in it.  We agreed that we would talk  about it more in the next session.  Plan:  Continue individual psychotherapy

## 2024-12-03 ENCOUNTER — APPOINTMENT (OUTPATIENT)
Dept: BEHAVIORAL HEALTH | Facility: CLINIC | Age: 29
End: 2024-12-03
Payer: COMMERCIAL

## 2024-12-03 DIAGNOSIS — F42.2 MIXED OBSESSIONAL THOUGHTS AND ACTS: ICD-10-CM

## 2024-12-03 DIAGNOSIS — F41.1 GENERALIZED ANXIETY DISORDER: Primary | ICD-10-CM

## 2024-12-03 PROCEDURE — 90837 PSYTX W PT 60 MINUTES: CPT | Performed by: PSYCHOLOGIST

## 2024-12-03 NOTE — PROGRESS NOTES
START TIME: 5 PM  END TIME: 6 PM      Diagnoses/Problems  RAKESH  OCD  Major Depressive Disorder, single episode, mild       Orders  Patient agreed to return for psychotherapy with this provider.     Individual Treatment Goals:    1. Eliminate OCD behaviors  2. Improve decision making ability- ACHIEVED  3. Improve ability to commit to decisions- ACHIEVED  4. Improve self-esteem    Note dictated with [employer] transcription software. Completed without full typed error editing and sent to avoid delay.         Chief Complaint    An interactive audio and video telecommunication system which permits real time communications between the patient (at the originating site) and provider (at the distant site) was utilized to provide this telehealth service.    Verbal consent was requested and obtained from CASSI MÉNDEZ      Past Medical History  Problems    · Patient denies significant medical history    Social History  Problems    · Never a smoker   · Occasional alcohol use    Mental Status Exam  No suicidal ideation nor intent.       Narrative    Telephone/Televideo Informed Consent for Psychotherapy was reviewed with the patient as follows:  There are potential benefits and risks of the use of telephone or video-conferencing that differ from in-person sessions. Specifically, the telephone or televideo system we are using may not be HIPAA compliant and may present limits to patient confidentiality. Confidentiality still applies for telepsychology services, and nobody will record the session without your permission. You agree to use the telephone or video-conferencing platform selected for our virtual sessions, and I will explain how to use it.  1) You need to use a webcam or smartphone during the session.  2) It is important that you be in a quiet, private space that is free of distractions (including cell phone or other devices) during the session.  3) It is important to use a secure internet connection rather than  public/free Wi-Fi.  4) It is important to be on time. If you need to cancel or change your tele-appointment, you must notify the psychologist in advance by phone or email.  5) We need a back-up plan (e.g., phone number where you can be reached) to restart the session or to reschedule it, in the event of technical problems.  6) We need a safety plan that includes at least one emergency contact and the closest emergency room to your location, in the event of a crisis situation.  7) If you are not an adult, we need the permission of your parent or legal guardian (and their contact information) for you to participate in telepsychology sessions.  Understanding and verbal agreement was attested to by the patient.    Patient was reached via video for today's session.  We spent today's session primarily discussing his need for reassurance.  Patient said that he recalls being a child and going to his parents and explaining to them his plans and how things are going to be okay.  He said that he would do this because it was the only way he could get support and reassurance from his parents and that it was the only way he could feel good about his plans and actually feel like things are going to be okay.  Patient does this constantly even today.  We discussed how he is certainly able to come up with ways to reassure himself but does not seem to be able to feel that these things are true until he shares them with someone else.  We talked about how this seems to be somewhat related to his aversion to being too confident.  It sounds like the patient has convinced himself over the years that he cannot truly trust his own judgment or should not be too confident about it and as a result he seeks reassurance.  We agreed that the patient would write down good things that happen every day between now and the next session.  He would then look at these good things and see how many of them he actually sought reassurance for.  We will take  a look at this some more in the next session.    Plan:  Continue individual psychotherapy

## 2024-12-04 ENCOUNTER — APPOINTMENT (OUTPATIENT)
Dept: BEHAVIORAL HEALTH | Facility: CLINIC | Age: 29
End: 2024-12-04
Payer: COMMERCIAL

## 2024-12-12 ENCOUNTER — APPOINTMENT (OUTPATIENT)
Dept: BEHAVIORAL HEALTH | Facility: CLINIC | Age: 29
End: 2024-12-12
Payer: COMMERCIAL

## 2024-12-12 ENCOUNTER — TELEMEDICINE (OUTPATIENT)
Dept: BEHAVIORAL HEALTH | Facility: CLINIC | Age: 29
End: 2024-12-12
Payer: COMMERCIAL

## 2024-12-12 ENCOUNTER — DOCUMENTATION (OUTPATIENT)
Dept: BEHAVIORAL HEALTH | Facility: CLINIC | Age: 29
End: 2024-12-12

## 2024-12-12 DIAGNOSIS — F41.1 GENERALIZED ANXIETY DISORDER: Primary | ICD-10-CM

## 2024-12-12 DIAGNOSIS — F42.2 MIXED OBSESSIONAL THOUGHTS AND ACTS: ICD-10-CM

## 2024-12-12 PROCEDURE — 90837 PSYTX W PT 60 MINUTES: CPT | Performed by: PSYCHOLOGIST

## 2024-12-12 NOTE — PROGRESS NOTES
Pt was driving and would be late if he pulled over for appt. Advised state law does not allow telehealth while driving. Pt opted to reschedule.

## 2024-12-12 NOTE — PROGRESS NOTES
START TIME: 6 PM  END TIME: 6:55 PM      Diagnoses/Problems  RAKESH  OCD  Major Depressive Disorder, single episode, mild       Orders  Patient agreed to return for psychotherapy with this provider.     Individual Treatment Goals:    1. Eliminate OCD behaviors  2. Improve decision making ability- ACHIEVED  3. Improve ability to commit to decisions- ACHIEVED  4. Improve self-esteem    Note dictated with [employer] transcription software. Completed without full typed error editing and sent to avoid delay.         Chief Complaint    An interactive audio and video telecommunication system which permits real time communications between the patient (at the originating site) and provider (at the distant site) was utilized to provide this telehealth service.    Verbal consent was requested and obtained from CASSI MÉNDEZ      Past Medical History  Problems    · Patient denies significant medical history    Social History  Problems    · Never a smoker   · Occasional alcohol use    Mental Status Exam  No suicidal ideation nor intent.       Narrative    Telephone/Televideo Informed Consent for Psychotherapy was reviewed with the patient as follows:  There are potential benefits and risks of the use of telephone or video-conferencing that differ from in-person sessions. Specifically, the telephone or televideo system we are using may not be HIPAA compliant and may present limits to patient confidentiality. Confidentiality still applies for telepsychology services, and nobody will record the session without your permission. You agree to use the telephone or video-conferencing platform selected for our virtual sessions, and I will explain how to use it.  1) You need to use a webcam or smartphone during the session.  2) It is important that you be in a quiet, private space that is free of distractions (including cell phone or other devices) during the session.  3) It is important to use a secure internet connection rather  than public/free Wi-Fi.  4) It is important to be on time. If you need to cancel or change your tele-appointment, you must notify the psychologist in advance by phone or email.  5) We need a back-up plan (e.g., phone number where you can be reached) to restart the session or to reschedule it, in the event of technical problems.  6) We need a safety plan that includes at least one emergency contact and the closest emergency room to your location, in the event of a crisis situation.  7) If you are not an adult, we need the permission of your parent or legal guardian (and their contact information) for you to participate in telepsychology sessions.  Understanding and verbal agreement was attested to by the patient.    Patient was reached via video for today's session.  Patient reported that he has been doing fairly well lately.  He said that he got some feedback recently that he tends to be the 1 who always end calls that he has at work.  Patient took this as a criticism and that perhaps it could come off as if he is rushing people off the phone because he does not want to talk to them or they are not as important.  This has never been his intention.  He mostly does this for efficiency reasons and also because he does not want to waste their time.  However, he did not experiment this week and with every call he was on, he made sure not to initiate ending the call himself.  Patient learned a lot from this.  He learned that it can get awkward which is something that he would end calls in order to avoid.  He also learned that most people just keep talking for an extended period of time.  He took this as a complement because it shows that they want to have a closer relationship with him and enjoy talking to him.  He plans to continue doing this and to practice doing it and also to participate in meetings where more than 1 person is present.  Patient said he tends to over think what he says in these situations.  We plan to  follow up on this more in the next session.    Plan:  Continue individual psychotherapy

## 2024-12-19 ENCOUNTER — APPOINTMENT (OUTPATIENT)
Dept: BEHAVIORAL HEALTH | Facility: CLINIC | Age: 29
End: 2024-12-19
Payer: COMMERCIAL

## 2024-12-19 DIAGNOSIS — F41.1 GENERALIZED ANXIETY DISORDER: Primary | ICD-10-CM

## 2024-12-19 DIAGNOSIS — F42.2 MIXED OBSESSIONAL THOUGHTS AND ACTS: ICD-10-CM

## 2024-12-19 PROCEDURE — 90837 PSYTX W PT 60 MINUTES: CPT | Performed by: PSYCHOLOGIST

## 2025-01-02 ENCOUNTER — APPOINTMENT (OUTPATIENT)
Dept: BEHAVIORAL HEALTH | Facility: CLINIC | Age: 30
End: 2025-01-02
Payer: COMMERCIAL

## 2025-01-09 ENCOUNTER — APPOINTMENT (OUTPATIENT)
Dept: BEHAVIORAL HEALTH | Facility: CLINIC | Age: 30
End: 2025-01-09
Payer: COMMERCIAL

## 2025-01-09 DIAGNOSIS — F42.2 MIXED OBSESSIONAL THOUGHTS AND ACTS: ICD-10-CM

## 2025-01-09 DIAGNOSIS — F41.1 GENERALIZED ANXIETY DISORDER: Primary | ICD-10-CM

## 2025-01-09 PROCEDURE — 90837 PSYTX W PT 60 MINUTES: CPT | Performed by: PSYCHOLOGIST

## 2025-01-10 NOTE — PROGRESS NOTES
START TIME: 8 PM  END TIME: 8:55 PM      Diagnoses/Problems  RAKESH  OCD  Major Depressive Disorder, single episode, mild       Orders  Patient agreed to return for psychotherapy with this provider.     Individual Treatment Goals:    1. Eliminate OCD behaviors  2. Improve decision making ability- ACHIEVED  3. Improve ability to commit to decisions- ACHIEVED  4. Improve self-esteem    Note dictated with [employer] transcription software. Completed without full typed error editing and sent to avoid delay.         Chief Complaint    An interactive audio and video telecommunication system which permits real time communications between the patient (at the originating site) and provider (at the distant site) was utilized to provide this telehealth service.    Verbal consent was requested and obtained from CASSI MÉNDEZ      Past Medical History  Problems    · Patient denies significant medical history    Social History  Problems    · Never a smoker   · Occasional alcohol use    Mental Status Exam  No suicidal ideation nor intent.       Narrative    Telephone/Televideo Informed Consent for Psychotherapy was reviewed with the patient as follows:  There are potential benefits and risks of the use of telephone or video-conferencing that differ from in-person sessions. Specifically, the telephone or televideo system we are using may not be HIPAA compliant and may present limits to patient confidentiality. Confidentiality still applies for telepsychology services, and nobody will record the session without your permission. You agree to use the telephone or video-conferencing platform selected for our virtual sessions, and I will explain how to use it.  1) You need to use a webcam or smartphone during the session.  2) It is important that you be in a quiet, private space that is free of distractions (including cell phone or other devices) during the session.  3) It is important to use a secure internet connection rather  than public/free Wi-Fi.  4) It is important to be on time. If you need to cancel or change your tele-appointment, you must notify the psychologist in advance by phone or email.  5) We need a back-up plan (e.g., phone number where you can be reached) to restart the session or to reschedule it, in the event of technical problems.  6) We need a safety plan that includes at least one emergency contact and the closest emergency room to your location, in the event of a crisis situation.  7) If you are not an adult, we need the permission of your parent or legal guardian (and their contact information) for you to participate in telepsychology sessions.  Understanding and verbal agreement was attested to by the patient.    Patient was reached via video for today's session.  Patient reported that he has been feeling more depressed lately..  The patient experiences seasonal depression every year and this is no exception.  He talked about feeling like he has taken a step back and his progress every year when this happens.  He was able to understand that this is not the case and that he cannot  his progress on whether or not he experiences depression or anxiety but rather how he deals with that and how severe it is.  He was able to acknowledge that he certainly is better capable of dealing with the depression that he has been in the past.  We talked about different ways that he can do this including just being active and trying to not get stuck in the cycle of avoiding things because he is feeling depressed until he feels better.  He said that Fannie confronted him about them not officially being a couple yet.  He said he responded honestly with her and told her that it is difficult for him to make a decision about these things and that if she no longer wants to wait for him that she should not.  She decided that she will.  Patient is still unsure how he feels about this.  He has had some significant financial stress as well  which occurred earlier today but was able to be resolved before our meeting.  He feels a lot better about this now.  Overall, he is experiencing increased depression due to the lack of sunlight recently but understands what to do to cope with this.  We plan to follow up on a more in the next session    Plan:  Continue individual psychotherapy

## 2025-01-16 ENCOUNTER — APPOINTMENT (OUTPATIENT)
Dept: BEHAVIORAL HEALTH | Facility: CLINIC | Age: 30
End: 2025-01-16
Payer: COMMERCIAL

## 2025-01-16 DIAGNOSIS — F42.2 MIXED OBSESSIONAL THOUGHTS AND ACTS: ICD-10-CM

## 2025-01-16 DIAGNOSIS — F41.1 GENERALIZED ANXIETY DISORDER: Primary | ICD-10-CM

## 2025-01-16 PROCEDURE — 90837 PSYTX W PT 60 MINUTES: CPT | Performed by: PSYCHOLOGIST

## 2025-01-17 NOTE — PROGRESS NOTES
START TIME: 8 PM  END TIME: 8:55 PM      Diagnoses/Problems  RAKESH  OCD  Major Depressive Disorder, single episode, mild       Orders  Patient agreed to return for psychotherapy with this provider.     Individual Treatment Goals:    1. Eliminate OCD behaviors  2. Improve decision making ability- ACHIEVED  3. Improve ability to commit to decisions- ACHIEVED  4. Improve self-esteem    Note dictated with [employer] transcription software. Completed without full typed error editing and sent to avoid delay.         Chief Complaint    An interactive audio and video telecommunication system which permits real time communications between the patient (at the originating site) and provider (at the distant site) was utilized to provide this telehealth service.    Verbal consent was requested and obtained from CASSI MÉNDEZ      Past Medical History  Problems    · Patient denies significant medical history    Social History  Problems    · Never a smoker   · Occasional alcohol use    Mental Status Exam  No suicidal ideation nor intent.       Narrative    Telephone/Televideo Informed Consent for Psychotherapy was reviewed with the patient as follows:  There are potential benefits and risks of the use of telephone or video-conferencing that differ from in-person sessions. Specifically, the telephone or televideo system we are using may not be HIPAA compliant and may present limits to patient confidentiality. Confidentiality still applies for telepsychology services, and nobody will record the session without your permission. You agree to use the telephone or video-conferencing platform selected for our virtual sessions, and I will explain how to use it.  1) You need to use a webcam or smartphone during the session.  2) It is important that you be in a quiet, private space that is free of distractions (including cell phone or other devices) during the session.  3) It is important to use a secure internet connection rather  than public/free Wi-Fi.  4) It is important to be on time. If you need to cancel or change your tele-appointment, you must notify the psychologist in advance by phone or email.  5) We need a back-up plan (e.g., phone number where you can be reached) to restart the session or to reschedule it, in the event of technical problems.  6) We need a safety plan that includes at least one emergency contact and the closest emergency room to your location, in the event of a crisis situation.  7) If you are not an adult, we need the permission of your parent or legal guardian (and their contact information) for you to participate in telepsychology sessions.  Understanding and verbal agreement was attested to by the patient.    Patient was reached via video for today's session.  Patient stated that things been going well lately.  He said that he is feeling better about work and is back into the swing of things.  He talked about some of the things that happened at work recently.  Patient was proud of himself for making a decision on his own without necessarily having input from his boss.  When he explained it to his boss, he was given positive feedback which helped to reassure him that he knows what he is doing.  Patient said that he expects to make much more money this year now that he will have his overpayment paid off by the end of the month and will be able to make more because of issues that only occurred last year.  We talked about dating.  Patient feels bad that he is going on dates with other women while he is still talking to Jada.  He said he is still unsure what he wants to do with his relationship with Jada.  However, it does seem clear that he is never really going to want to have an exclusive relationship with her.  This is partially why he feels bad, he somewhat feels like he is taking advantage of her.  It is true it seems that the patient is likely to feel less pressure dating because he does have someone who  is already very interested in him.  Patient said that he decided to schedule a date for Monday because he usually feels awful that day.  He remembered that I frequently tell him that he should never make plans based on his mood and that he is likely to feel better than he normally does on Monday in anticipation of the date.  We agreed to follow up on how that went and work related issues in the next session.  Plan:  Continue individual psychotherapy

## 2025-01-23 ENCOUNTER — APPOINTMENT (OUTPATIENT)
Dept: BEHAVIORAL HEALTH | Facility: CLINIC | Age: 30
End: 2025-01-23
Payer: COMMERCIAL

## 2025-01-23 DIAGNOSIS — F33.8 SEASONAL AFFECTIVE DISORDER (CMS-HCC): Primary | ICD-10-CM

## 2025-01-23 DIAGNOSIS — F41.1 GENERALIZED ANXIETY DISORDER: ICD-10-CM

## 2025-01-23 PROCEDURE — 90837 PSYTX W PT 60 MINUTES: CPT | Performed by: PSYCHOLOGIST

## 2025-01-24 NOTE — PROGRESS NOTES
FOLLOW UP PSYCHOTHERAPY SESSION-VIRTUAL  Note dictated with [email or web address] transcription software. Completed without full typed error editing and sent to avoid delay.     START TIME: 8 PM  END TIME: 8:55 PM    Diagnoses: RAKESH, MDD, recurrent, moderate with seasonal pattern, History of OCD but no current symptoms      Past Medical History  Problems    · Patient denies significant medical history     Social History  Problems    · Never a smoker   · Occasional alcohol use     Mental Status Exam  No suicidal ideation nor intent.       Narrative     Telephone/Televideo Informed Consent for Psychotherapy was reviewed with the patient as follows:  There are potential benefits and risks of the use of telephone or video-conferencing that differ from in-person sessions. Specifically, the telephone or televideo system we are using may not be HIPAA compliant and may present limits to patient confidentiality. Confidentiality still applies for telepsychology services, and nobody will record the session without your permission. You agree to use the telephone or video-conferencing platform selected for our virtual sessions, and I will explain how to use it.  1) You need to use a webcam or smartphone during the session.  2) It is important that you be in a quiet, private space that is free of distractions (including cell phone or other devices) during the session.  3) It is important to use a secure internet connection rather than public/free Wi-Fi.  4) It is important to be on time. If you need to cancel or change your tele-appointment, you must notify the psychologist in advance by phone or email.  5) We need a back-up plan (e.g., phone number where you can be reached) to restart the session or to reschedule it, in the event of technical problems.  6) We need a safety plan that includes at least one emergency contact and the closest emergency room to your location, in the event of a crisis situation.  7) If you are not an  adult, we need the permission of your parent or legal guardian (and their contact information) for you to participate in telepsychology sessions.  Understanding and verbal agreement was attested to by the patient.     Patient was reached via video for today's session.  Patient reported that he has been feeling much more depressed lately.  He said that he decided to increase his dosage of Zoloft about 3 days ago because of this.  This led to a conversation about the 5 winter seasons that we have worked together through and differences between them.  In looking back at previous notes from around this time of year, we were able to realize that his depression was much worse in the past and it was often combined with anxiety and OCD symptoms that were more overwhelming as well.  That being said, he is certainly suffering currently.  He said that he struggles to find interest in anything.  He said that he is not getting pleasure out of much things.  Patient feels like he needs to put on a front to make sure that people do not realize he is depressed.  However, this is less of an issue than it has been in the past.  We essentially talked about how he needs to lower his expectations for himself right now, give himself some ayla, try to find something in social interactions that spark some interest in him (rather than faking being more happy-go-portia), remind himself he is not making excuses for himself but rather accommodations and also remind himself that he cannot perform at his best while depressed but he is still performing more than adequately.  We agreed to follow up on these things more in the next session.     Plan:  Patient agreed to return for psychotherapy with this provider.      Individual Treatment Goals:     1. Eliminate OCD behaviors- ACHIEVED  2. Improve decision making ability- ACHIEVED  3. Improve ability to commit to decisions- ACHIEVED  4. Improve self-esteem-IMPROVING  5.  Decrease depressive  symptoms

## 2025-01-25 DIAGNOSIS — F41.1 GENERALIZED ANXIETY DISORDER: ICD-10-CM

## 2025-01-25 DIAGNOSIS — G47.00 INSOMNIA, UNSPECIFIED TYPE: ICD-10-CM

## 2025-01-26 RX ORDER — TIAGABINE HYDROCHLORIDE 4 MG/1
8 TABLET, FILM COATED ORAL NIGHTLY
Qty: 180 TABLET | Refills: 1 | OUTPATIENT
Start: 2025-01-26 | End: 2025-07-25

## 2025-01-28 ENCOUNTER — APPOINTMENT (OUTPATIENT)
Dept: BEHAVIORAL HEALTH | Facility: CLINIC | Age: 30
End: 2025-01-28
Payer: COMMERCIAL

## 2025-02-04 ENCOUNTER — APPOINTMENT (OUTPATIENT)
Dept: BEHAVIORAL HEALTH | Facility: CLINIC | Age: 30
End: 2025-02-04
Payer: COMMERCIAL

## 2025-02-04 DIAGNOSIS — F33.8 SEASONAL AFFECTIVE DISORDER (CMS-HCC): Primary | ICD-10-CM

## 2025-02-04 DIAGNOSIS — F41.1 GENERALIZED ANXIETY DISORDER: ICD-10-CM

## 2025-02-05 NOTE — PROGRESS NOTES
FOLLOW UP PSYCHOTHERAPY SESSION-VIRTUAL  Note dictated with [email or web address] transcription software. Completed without full typed error editing and sent to avoid delay.     START TIME: 8 PM  END TIME: 8:55 PM    Diagnoses: RAKESH, MDD, recurrent, moderate with seasonal pattern, History of OCD but no current symptoms      Past Medical History  Problems    · Patient denies significant medical history     Social History  Problems    · Never a smoker   · Occasional alcohol use     Mental Status Exam  No suicidal ideation nor intent.       Narrative     Telephone/Televideo Informed Consent for Psychotherapy was reviewed with the patient as follows:  There are potential benefits and risks of the use of telephone or video-conferencing that differ from in-person sessions. Specifically, the telephone or televideo system we are using may not be HIPAA compliant and may present limits to patient confidentiality. Confidentiality still applies for telepsychology services, and nobody will record the session without your permission. You agree to use the telephone or video-conferencing platform selected for our virtual sessions, and I will explain how to use it.  1) You need to use a webcam or smartphone during the session.  2) It is important that you be in a quiet, private space that is free of distractions (including cell phone or other devices) during the session.  3) It is important to use a secure internet connection rather than public/free Wi-Fi.  4) It is important to be on time. If you need to cancel or change your tele-appointment, you must notify the psychologist in advance by phone or email.  5) We need a back-up plan (e.g., phone number where you can be reached) to restart the session or to reschedule it, in the event of technical problems.  6) We need a safety plan that includes at least one emergency contact and the closest emergency room to your location, in the event of a crisis situation.  7) If you are not an  adult, we need the permission of your parent or legal guardian (and their contact information) for you to participate in telepsychology sessions.  Understanding and verbal agreement was attested to by the patient.     Patient was reached via video for today's session.  Patient said that things have been going a bit better recently.  He said that he is doing a very good job at work although he is very stressed about it.  We spent most of the session talking about work.  Patient said that he has been frustrated with his boss a little bit lately although his boss continues to be supportive.  He stated that he feels like his boss does not trust him to do his job.  Patient said that he did a great job at his presentation at the [employer] last week.  He said he was able to still be himself just the most optimistic version of his now depressed self.  He said that he did notice that he was significantly less and drained afterward as a result.  Patient said he has been drinking quite a bit lately.  This is because he was given a lot of beer from the [employer].  He said it is all gone now and he is not going to be drinking for a little while.  He said exercises been going well.  Patient talked about trying to have more balance in his life.  He tends to give most of his energy and focus to certain things while neglecting some other areas of his life.  We discussed how if he wants to have more balance, he cannot give any 1 thing 100% of his focus.  I gave some examples from my own life that he found helpful.  We agreed to talk more about it in the next session.    Plan:  Patient agreed to return for psychotherapy with this provider.      Individual Treatment Goals:     1. Eliminate OCD behaviors- ACHIEVED  2. Improve decision making ability- ACHIEVED  3. Improve ability to commit to decisions- ACHIEVED  4. Improve self-esteem-IMPROVING  5.  Decrease depressive symptoms

## 2025-02-11 ENCOUNTER — APPOINTMENT (OUTPATIENT)
Dept: BEHAVIORAL HEALTH | Facility: CLINIC | Age: 30
End: 2025-02-11

## 2025-02-11 DIAGNOSIS — F41.1 GENERALIZED ANXIETY DISORDER: ICD-10-CM

## 2025-02-11 DIAGNOSIS — F33.8 SEASONAL AFFECTIVE DISORDER (CMS-HCC): Primary | ICD-10-CM

## 2025-02-11 PROCEDURE — 90837 PSYTX W PT 60 MINUTES: CPT | Performed by: PSYCHOLOGIST

## 2025-02-12 NOTE — PROGRESS NOTES
FOLLOW UP PSYCHOTHERAPY SESSION-VIRTUAL  Note dictated with CC video[employer]Doctor kinetic transcription software. Completed without full typed error editing and sent to avoid delay.     START TIME: 8 PM  END TIME: 8:55 PM    Diagnoses: RAKESH, MDD, recurrent, moderate with seasonal pattern, History of OCD but no current symptoms      Past Medical History  Problems    · Patient denies significant medical history     Social History  Problems    · Never a smoker   · Occasional alcohol use     Mental Status Exam  No suicidal ideation nor intent.       Narrative     Telephone/Televideo Informed Consent for Psychotherapy was reviewed with the patient as follows:  There are potential benefits and risks of the use of telephone or video-conferencing that differ from in-person sessions. Specifically, the telephone or televideo system we are using may not be HIPAA compliant and may present limits to patient confidentiality. Confidentiality still applies for telepsychology services, and nobody will record the session without your permission. You agree to use the telephone or video-conferencing platform selected for our virtual sessions, and I will explain how to use it.  1) You need to use a webcam or smartphone during the session.  2) It is important that you be in a quiet, private space that is free of distractions (including cell phone or other devices) during the session.  3) It is important to use a secure internet connection rather than public/free Wi-Fi.  4) It is important to be on time. If you need to cancel or change your tele-appointment, you must notify the psychologist in advance by phone or email.  5) We need a back-up plan (e.g., phone number where you can be reached) to restart the session or to reschedule it, in the event of technical problems.  6) We need a safety plan that includes at least one emergency contact and the closest emergency room to your location, in the event of a crisis situation.  7) If you are not an  adult, we need the permission of your parent or legal guardian (and their contact information) for you to participate in telepsychology sessions.  Understanding and verbal agreement was attested to by the patient.     Patient was reached via video for today's session.  Patient reported that he continues to struggle significantly with his seasonal depression.  He talked about how strategies to help him get through that are helpful but ultimately, he just does not want to have to deal with this every year.  We talked about how there could be ways in which we can mitigate it some more but that frankly therapy is somewhat limited in this regard.  He said he has not purchased the therapy light yet.  We talked about that being a helpful addition potentially with almost no downside.  Patient also plans to talk to his primary care provider about possibly trying Wellbutrin or another medication.  He said he has been taking 150 mg of Zoloft for the last 3 weeks now but has not noticed a difference thus far.  He also plans to take his trazodone more consistently at 1030 every night so that he can have a more consistent bed and wake time.  Patient said that one of his colleagues suffers from seasonal depression as well and has emphasized the importance of keeping a consistent bed and wake time.  We agreed that we would follow up more on this in the next session.  Plan:  Patient agreed to return for psychotherapy with this provider.      Individual Treatment Goals:     1. Eliminate OCD behaviors- ACHIEVED  2. Improve decision making ability- ACHIEVED  3. Improve ability to commit to decisions- ACHIEVED  4. Improve self-esteem-IMPROVING  5.  Decrease depressive symptoms

## 2025-02-14 ENCOUNTER — TELEPHONE (OUTPATIENT)
Dept: BEHAVIORAL HEALTH | Facility: CLINIC | Age: 30
End: 2025-02-14
Payer: COMMERCIAL

## 2025-02-14 DIAGNOSIS — F41.1 GENERALIZED ANXIETY DISORDER: ICD-10-CM

## 2025-02-14 DIAGNOSIS — G47.00 INSOMNIA, UNSPECIFIED TYPE: ICD-10-CM

## 2025-02-14 RX ORDER — TIAGABINE HYDROCHLORIDE 4 MG/1
8 TABLET, FILM COATED ORAL NIGHTLY
Qty: 180 TABLET | Refills: 1 | OUTPATIENT
Start: 2025-02-14 | End: 2025-08-13

## 2025-02-17 RX ORDER — TIAGABINE HYDROCHLORIDE 4 MG/1
8 TABLET, FILM COATED ORAL NIGHTLY
Qty: 180 TABLET | Refills: 1 | Status: SHIPPED | OUTPATIENT
Start: 2025-02-17 | End: 2025-08-16

## 2025-02-17 NOTE — TELEPHONE ENCOUNTER
RX REFILL-tiaGABine (Gabitril) 4 mg tablet () patient has an upcoming virtual appointment scheduled for  but is completely out of medication.

## 2025-02-18 ENCOUNTER — APPOINTMENT (OUTPATIENT)
Dept: BEHAVIORAL HEALTH | Facility: CLINIC | Age: 30
End: 2025-02-18

## 2025-02-18 DIAGNOSIS — F33.8 SEASONAL AFFECTIVE DISORDER (CMS-HCC): Primary | ICD-10-CM

## 2025-02-18 DIAGNOSIS — F41.1 GENERALIZED ANXIETY DISORDER: ICD-10-CM

## 2025-02-18 PROCEDURE — 90837 PSYTX W PT 60 MINUTES: CPT | Performed by: PSYCHOLOGIST

## 2025-02-19 NOTE — PROGRESS NOTES
FOLLOW UP PSYCHOTHERAPY SESSION-VIRTUAL  Note dictated with [employer] transcription software. Completed without full typed error editing and sent to avoid delay.     START TIME: 8 PM  END TIME: 8:55 PM    Diagnoses: RAKESH, MDD, recurrent, moderate with seasonal pattern, History of OCD but no current symptoms      Past Medical History  Problems    · Patient denies significant medical history     Social History  Problems    · Never a smoker   · Occasional alcohol use     Mental Status Exam  No suicidal ideation nor intent.       Narrative     Telephone/Televideo Informed Consent for Psychotherapy was reviewed with the patient as follows:  There are potential benefits and risks of the use of telephone or video-conferencing that differ from in-person sessions. Specifically, the telephone or televideo system we are using may not be HIPAA compliant and may present limits to patient confidentiality. Confidentiality still applies for telepsychology services, and nobody will record the session without your permission. You agree to use the telephone or video-conferencing platform selected for our virtual sessions, and I will explain how to use it.  1) You need to use a webcam or smartphone during the session.  2) It is important that you be in a quiet, private space that is free of distractions (including cell phone or other devices) during the session.  3) It is important to use a secure internet connection rather than public/free Wi-Fi.  4) It is important to be on time. If you need to cancel or change your tele-appointment, you must notify the psychologist in advance by phone or email.  5) We need a back-up plan (e.g., phone number where you can be reached) to restart the session or to reschedule it, in the event of technical problems.  6) We need a safety plan that includes at least one emergency contact and the closest emergency room to your location, in the event of a crisis situation.  7) If you are not an  adult, we need the permission of your parent or legal guardian (and their contact information) for you to participate in telepsychology sessions.  Understanding and verbal agreement was attested to by the patient.     Patient was reached via video for today's session.  Patient reported that he has been feeling about the same lately.  He stated that he has noticed that he has been more productive as of late.  He said that because he is seemingly functioning better, he is okay with how he has been feeling.  Patient understands that in about a month, he will start to feel better as the season changes.  He said that typically around Saint Kristopher's Day is when he starts to feel better.  Patient still has not purchased the therapy light.  He promised that he would do it tonight.  He he tried reaching out to his PCP but has been unable to make an appointment anytime soon.  I recommended some other places that he can look into.  Patient stated that work has been going fairly well.  He said that he still feels like he needs to switch to another company at some point soon because of their business decisions and the economy right now.  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  We agreed to follow up on this more in the next session.    Plan:  Patient agreed to return for psychotherapy with this provider.      Individual Treatment Goals:     1. Eliminate OCD behaviors- ACHIEVED  2. Improve decision making ability- ACHIEVED  3. Improve ability to commit to decisions- ACHIEVED  4. Improve self-esteem-IMPROVING  5.  Decrease depressive symptoms

## 2025-02-25 ENCOUNTER — APPOINTMENT (OUTPATIENT)
Dept: BEHAVIORAL HEALTH | Facility: CLINIC | Age: 30
End: 2025-02-25
Payer: COMMERCIAL

## 2025-02-25 DIAGNOSIS — F33.8 SEASONAL AFFECTIVE DISORDER (CMS-HCC): Primary | ICD-10-CM

## 2025-02-25 PROCEDURE — 90837 PSYTX W PT 60 MINUTES: CPT | Performed by: PSYCHOLOGIST

## 2025-02-26 NOTE — PROGRESS NOTES
FOLLOW UP PSYCHOTHERAPY SESSION-VIRTUAL  Note dictated with [employer] transcription software. Completed without full typed error editing and sent to avoid delay.     START TIME: 7 PM  END TIME: 7:55 PM    Diagnoses: RAKESH, MDD, recurrent, moderate with seasonal pattern, History of OCD but no current symptoms      Past Medical History  Problems    · Patient denies significant medical history     Social History  Problems    · Never a smoker   · Occasional alcohol use     Mental Status Exam  No suicidal ideation nor intent.       Narrative     Telephone/Televideo Informed Consent for Psychotherapy was reviewed with the patient as follows:  There are potential benefits and risks of the use of telephone or video-conferencing that differ from in-person sessions. Specifically, the telephone or televideo system we are using may not be HIPAA compliant and may present limits to patient confidentiality. Confidentiality still applies for telepsychology services, and nobody will record the session without your permission. You agree to use the telephone or video-conferencing platform selected for our virtual sessions, and I will explain how to use it.  1) You need to use a webcam or smartphone during the session.  2) It is important that you be in a quiet, private space that is free of distractions (including cell phone or other devices) during the session.  3) It is important to use a secure internet connection rather than public/free Wi-Fi.  4) It is important to be on time. If you need to cancel or change your tele-appointment, you must notify the psychologist in advance by phone or email.  5) We need a back-up plan (e.g., phone number where you can be reached) to restart the session or to reschedule it, in the event of technical problems.  6) We need a safety plan that includes at least one emergency contact and the closest emergency room to your location, in the event of a crisis situation.  7) If you are not an  adult, we need the permission of your parent or legal guardian (and their contact information) for you to participate in telepsychology sessions.  Understanding and verbal agreement was attested to by the patient.     Patient reported that he has been feeling a little bit better lately.  This was evident during the session as well.  Patient just seemed to be more upbeat overall.  He said that work has been going well.  He talked about being assertive with someone on the job and how this would have been much more difficult for him to do in the past.  He said that he ordered the SAD lamp and should be getting it soon.  We were unsure if his improvement in his mood was related to the increase in his zoloft or the weather being warmer and more fitz lately.  I encouraged him to stay on the increased dose for now.  Overall, he is doing well and we agreed to follow up on these things more in the next session.     Plan:  Patient agreed to return for psychotherapy with this provider.      Individual Treatment Goals:     1. Eliminate OCD behaviors- ACHIEVED  2. Improve decision making ability- ACHIEVED  3. Improve ability to commit to decisions- ACHIEVED  4. Improve self-esteem-IMPROVING  5.  Decrease depressive symptoms

## 2025-03-04 ENCOUNTER — APPOINTMENT (OUTPATIENT)
Dept: BEHAVIORAL HEALTH | Facility: CLINIC | Age: 30
End: 2025-03-04
Payer: COMMERCIAL

## 2025-03-04 DIAGNOSIS — F33.8 SEASONAL AFFECTIVE DISORDER (CMS-HCC): Primary | ICD-10-CM

## 2025-03-04 DIAGNOSIS — F41.1 GENERALIZED ANXIETY DISORDER: ICD-10-CM

## 2025-03-04 PROCEDURE — 90837 PSYTX W PT 60 MINUTES: CPT | Performed by: PSYCHOLOGIST

## 2025-03-05 NOTE — PROGRESS NOTES
FOLLOW UP PSYCHOTHERAPY SESSION-VIRTUAL  Note dictated with [employer] transcription software. Completed without full typed error editing and sent to avoid delay.     START TIME: 6 PM  END TIME: 6:55 PM    Diagnoses: RAKESH, MDD, recurrent, moderate with seasonal pattern, History of OCD but no current symptoms      Past Medical History  Problems    · Patient denies significant medical history     Social History  Problems    · Never a smoker   · Occasional alcohol use     Mental Status Exam  No suicidal ideation nor intent.       Narrative     Telephone/Televideo Informed Consent for Psychotherapy was reviewed with the patient as follows:  There are potential benefits and risks of the use of telephone or video-conferencing that differ from in-person sessions. Specifically, the telephone or televideo system we are using may not be HIPAA compliant and may present limits to patient confidentiality. Confidentiality still applies for telepsychology services, and nobody will record the session without your permission. You agree to use the telephone or video-conferencing platform selected for our virtual sessions, and I will explain how to use it.  1) You need to use a webcam or smartphone during the session.  2) It is important that you be in a quiet, private space that is free of distractions (including cell phone or other devices) during the session.  3) It is important to use a secure internet connection rather than public/free Wi-Fi.  4) It is important to be on time. If you need to cancel or change your tele-appointment, you must notify the psychologist in advance by phone or email.  5) We need a back-up plan (e.g., phone number where you can be reached) to restart the session or to reschedule it, in the event of technical problems.  6) We need a safety plan that includes at least one emergency contact and the closest emergency room to your location, in the event of a crisis situation.  7) If you are not an  adult, we need the permission of your parent or legal guardian (and their contact information) for you to participate in telepsychology sessions.  Understanding and verbal agreement was attested to by the patient.     Patient reported that he has continued to feel better over the last few weeks in regards to his mood.  Today was especially good because the weather was much warmer than usual.  Patient stated that things are going very well at work as well.  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  We discussed how he is doing this well despite being depressed.  Patient said that it is clear to him that his boss is trying to make sure he does not leave.  He has talked him about managerial positions and other opportunities.  Patient said that he was asked to interview for a job that is extremely similar to what he is doing now but his base pay would be more than double what he makes now.  This would give him much more security and would result in I would assume less anxiety because he does not have to focus so much on sales to make ends meet.  Patient said that he is going on a date this Friday.  He said that the woman asked for his last name.  Patient was anxious about giving this because he knows that they will search him online.  He does not have anything to hide so I suggested that he give her his last name.  We agreed to follow up on this more in the next session.    Plan:  Patient agreed to return for psychotherapy with this provider.      Individual Treatment Goals:     1. Eliminate OCD behaviors- ACHIEVED  2. Improve decision making ability- ACHIEVED  3. Improve ability to commit to decisions- ACHIEVED  4. Improve self-esteem-IMPROVING  5.  Decrease depressive symptoms

## 2025-03-11 ENCOUNTER — APPOINTMENT (OUTPATIENT)
Dept: BEHAVIORAL HEALTH | Facility: CLINIC | Age: 30
End: 2025-03-11
Payer: COMMERCIAL

## 2025-03-11 DIAGNOSIS — F33.8 SEASONAL AFFECTIVE DISORDER (CMS-HCC): Primary | ICD-10-CM

## 2025-03-11 PROCEDURE — 90837 PSYTX W PT 60 MINUTES: CPT | Performed by: PSYCHOLOGIST

## 2025-03-18 ENCOUNTER — APPOINTMENT (OUTPATIENT)
Dept: BEHAVIORAL HEALTH | Facility: CLINIC | Age: 30
End: 2025-03-18
Payer: COMMERCIAL

## 2025-03-18 DIAGNOSIS — F41.1 GENERALIZED ANXIETY DISORDER: ICD-10-CM

## 2025-03-18 DIAGNOSIS — F33.8 SEASONAL AFFECTIVE DISORDER (CMS-HCC): Primary | ICD-10-CM

## 2025-03-18 PROCEDURE — 90837 PSYTX W PT 60 MINUTES: CPT | Performed by: PSYCHOLOGIST

## 2025-03-18 NOTE — PROGRESS NOTES
FOLLOW UP PSYCHOTHERAPY SESSION-VIRTUAL  Note dictated with [employer] transcription software. Completed without full typed error editing and sent to avoid delay.     START TIME: 6 PM  END TIME: 6:55 PM    Diagnoses: RAKESH, MDD, recurrent, moderate with seasonal pattern, History of OCD but no current symptoms      Past Medical History  Problems    · Patient denies significant medical history     Social History  Problems    · Never a smoker   · Occasional alcohol use     Mental Status Exam  No suicidal ideation nor intent.       Narrative     Telephone/Televideo Informed Consent for Psychotherapy was reviewed with the patient as follows:  There are potential benefits and risks of the use of telephone or video-conferencing that differ from in-person sessions. Specifically, the telephone or televideo system we are using may not be HIPAA compliant and may present limits to patient confidentiality. Confidentiality still applies for telepsychology services, and nobody will record the session without your permission. You agree to use the telephone or video-conferencing platform selected for our virtual sessions, and I will explain how to use it.  1) You need to use a webcam or smartphone during the session.  2) It is important that you be in a quiet, private space that is free of distractions (including cell phone or other devices) during the session.  3) It is important to use a secure internet connection rather than public/free Wi-Fi.  4) It is important to be on time. If you need to cancel or change your tele-appointment, you must notify the psychologist in advance by phone or email.  5) We need a back-up plan (e.g., phone number where you can be reached) to restart the session or to reschedule it, in the event of technical problems.  6) We need a safety plan that includes at least one emergency contact and the closest emergency room to your location, in the event of a crisis situation.  7) If you are not an  adult, we need the permission of your parent or legal guardian (and their contact information) for you to participate in telepsychology sessions.  Understanding and verbal agreement was attested to by the patient.    Patient reported that things been going well for the most part.  He said that he has another meeting with the other employer that is likely going to hire him.  He said that the last meeting went very well.  He feels rather confident going in but is trying not to get ahead of himself.  Patient said that things at his current job are getting a bit more tense.  He is doing very well and we agreed that this is likely because he cares a lot less right now.  This is allowing him to be open and honest with people and to overall just do a better job as a result of it.  Patient said that he was unable to go on his date with the woman named Mónica.  He said that they rescheduled.  Patient is excited about this.  He also heard from Cutler again and is likely going to go out with her again.  We discussed his reasoning for staying with Jada.  He said that she feels safe, stable, caring.  He said that they have great sex but he also feels guilty.  We discussed the difference between him feeling and mentioning above all else safety and stability with Jada and how he felt with his ex Lorena.  We talked about sticking to decisions that are made when we are at least emotional for the most part.  We agreed to follow up on all this more in the next session    Plan:  Patient agreed to return for psychotherapy with this provider.      Individual Treatment Goals:     1. Eliminate OCD behaviors- ACHIEVED  2. Improve decision making ability- ACHIEVED  3. Improve ability to commit to decisions- ACHIEVED  4. Improve self-esteem-IMPROVING  5.  Decrease depressive symptoms

## 2025-03-25 ENCOUNTER — APPOINTMENT (OUTPATIENT)
Dept: BEHAVIORAL HEALTH | Facility: CLINIC | Age: 30
End: 2025-03-25
Payer: COMMERCIAL

## 2025-04-01 ENCOUNTER — TELEMEDICINE (OUTPATIENT)
Dept: BEHAVIORAL HEALTH | Facility: CLINIC | Age: 30
End: 2025-04-01
Payer: COMMERCIAL

## 2025-04-01 ENCOUNTER — APPOINTMENT (OUTPATIENT)
Dept: BEHAVIORAL HEALTH | Facility: CLINIC | Age: 30
End: 2025-04-01
Payer: COMMERCIAL

## 2025-04-01 DIAGNOSIS — F33.8 SEASONAL AFFECTIVE DISORDER: Primary | ICD-10-CM

## 2025-04-01 DIAGNOSIS — F41.1 GENERALIZED ANXIETY DISORDER: ICD-10-CM

## 2025-04-01 PROCEDURE — 90837 PSYTX W PT 60 MINUTES: CPT | Performed by: PSYCHOLOGIST

## 2025-04-08 NOTE — PROGRESS NOTES
FOLLOW UP PSYCHOTHERAPY SESSION-VIRTUAL  Note dictated with [employer] transcription software. Completed without full typed error editing and sent to avoid delay.     START TIME: 6 PM  END TIME: 6:55 PM    Diagnoses: RAKESH, MDD, recurrent, moderate with seasonal pattern, History of OCD but no current symptoms      Past Medical History  Problems    · Patient denies significant medical history     Social History  Problems    · Never a smoker   · Occasional alcohol use     Mental Status Exam  No suicidal ideation nor intent.       Narrative     Telephone/Televideo Informed Consent for Psychotherapy was reviewed with the patient as follows:  There are potential benefits and risks of the use of telephone or video-conferencing that differ from in-person sessions. Specifically, the telephone or televideo system we are using may not be HIPAA compliant and may present limits to patient confidentiality. Confidentiality still applies for telepsychology services, and nobody will record the session without your permission. You agree to use the telephone or video-conferencing platform selected for our virtual sessions, and I will explain how to use it.  1) You need to use a webcam or smartphone during the session.  2) It is important that you be in a quiet, private space that is free of distractions (including cell phone or other devices) during the session.  3) It is important to use a secure internet connection rather than public/free Wi-Fi.  4) It is important to be on time. If you need to cancel or change your tele-appointment, you must notify the psychologist in advance by phone or email.  5) We need a back-up plan (e.g., phone number where you can be reached) to restart the session or to reschedule it, in the event of technical problems.  6) We need a safety plan that includes at least one emergency contact and the closest emergency room to your location, in the event of a crisis situation.  7) If you are not an  "adult, we need the permission of your parent or legal guardian (and their contact information) for you to participate in telepsychology sessions.  Understanding and verbal agreement was attested to by the patient.    Patient reported that things been okay lately.  He said that not much has changed so far.  He reported that his mood has continued to improve as the weather has and this has been a relief for him.  Patient said he is meeting someone from the company that wants to hire him later this week.  He reported that he is still seeing Mónica and feels good about her.  We discussed his thoughts on all of these changes that are coming, his desire for reassurance and the importance of sticking to decisions that we made in \"wise mind\" vs anxious mind.  We plan to follow up on these things more in the next session.      Plan:  Patient agreed to return for psychotherapy with this provider.      Individual Treatment Goals:     1. Eliminate OCD behaviors- ACHIEVED  2. Improve decision making ability- ACHIEVED  3. Improve ability to commit to decisions- ACHIEVED  4. Improve self-esteem-IMPROVING  5.  Decrease depressive symptoms- IMPROVING  "

## 2025-04-10 ENCOUNTER — APPOINTMENT (OUTPATIENT)
Dept: BEHAVIORAL HEALTH | Facility: CLINIC | Age: 30
End: 2025-04-10
Payer: COMMERCIAL

## 2025-04-10 DIAGNOSIS — F33.8 SEASONAL AFFECTIVE DISORDER: Primary | ICD-10-CM

## 2025-04-10 DIAGNOSIS — F41.1 GENERALIZED ANXIETY DISORDER: ICD-10-CM

## 2025-04-10 PROCEDURE — 90837 PSYTX W PT 60 MINUTES: CPT | Performed by: PSYCHOLOGIST

## 2025-04-11 NOTE — PROGRESS NOTES
FOLLOW UP PSYCHOTHERAPY SESSION-VIRTUAL  Note dictated with [email or web address] transcription software. Completed without full typed error editing and sent to avoid delay.     START TIME: 8 PM  END TIME: 8:55 PM    Diagnoses: RAKESH, MDD, recurrent, moderate with seasonal pattern, History of OCD but no current symptoms      Past Medical History  Problems    · Patient denies significant medical history     Social History  Problems    · Never a smoker   · Occasional alcohol use     Mental Status Exam  No suicidal ideation nor intent.       Narrative     Telephone/Televideo Informed Consent for Psychotherapy was reviewed with the patient as follows:  There are potential benefits and risks of the use of telephone or video-conferencing that differ from in-person sessions. Specifically, the telephone or televideo system we are using may not be HIPAA compliant and may present limits to patient confidentiality. Confidentiality still applies for telepsychology services, and nobody will record the session without your permission. You agree to use the telephone or video-conferencing platform selected for our virtual sessions, and I will explain how to use it.  1) You need to use a webcam or smartphone during the session.  2) It is important that you be in a quiet, private space that is free of distractions (including cell phone or other devices) during the session.  3) It is important to use a secure internet connection rather than public/free Wi-Fi.  4) It is important to be on time. If you need to cancel or change your tele-appointment, you must notify the psychologist in advance by phone or email.  5) We need a back-up plan (e.g., phone number where you can be reached) to restart the session or to reschedule it, in the event of technical problems.  6) We need a safety plan that includes at least one emergency contact and the closest emergency room to your location, in the event of a crisis situation.  7) If you are not an  "adult, we need the permission of your parent or legal guardian (and their contact information) for you to participate in telepsychology sessions.  Understanding and verbal agreement was attested to by the patient.    Patient reported that things have been going well so far.  He stated that he had his interview with that other company and it went incredibly well.  He said that he was essentially told that he has a job but is not \"counting his chickens before they bernstein.\"  Patient said he also went on another date with Manju.  He said that it was odd for a number of reasons but it seems clear that he does not want to be in a long-term relationship with her.  He has a date with Mónica tomorrow and they are supposed to watch a movie at her house.  Patient anticipates that they will likely become intimate and feels uncomfortable with that if he is still talking to Jada.  I asked the patient if he thought that he really likes someone, if he would be able to go out on a date with someone else and he said probably not.  We talked about how this is another example that things will likely end with Jada at some point, probably soon but for a number of other reasons he has not decided to end it just yet.  We ended the session by talking about the patient's anger.  He said he has never really \"honored\" this part of himself or acknowledged it.  He said no one in his life has any idea just how angry he is about things.  We were able to narrow the this emotion down to resentment.  Patient said that he resents basically his entire family and many people in the world essentially.  He resents his entire childhood because he feels that he would not have had to work so hard to overcome his anxiety related challenges and depression if he had more support as a child.  This led to a conversation about how it sounds like in part, he is angry that he cannot rely on others to support him.  Each time the patient achieves a goal that he has " "set for himself, he feels like he is proving others wrong and this is another example of him being able to do things \"on my own.\"  However, because it is an example of that, it is also an example of the fact that he cannot rely on other people.  We discussed how being vulnerable with others and putting his trust in them for their support is likely the best way to overcome this.  He also talked about how he is always been interested in helping others and we agreed that this is likely another way for him to feel that holding him although I do not believe that that will be enough.  We agreed that we would follow up on this more in the next session.    Plan:  Patient agreed to return for psychotherapy with this provider.      Individual Treatment Goals:     1. Eliminate OCD behaviors- ACHIEVED  2. Improve decision making ability- ACHIEVED  3. Improve ability to commit to decisions- ACHIEVED  4. Improve self-esteem-IMPROVING  5.  Decrease depressive symptoms- IMPROVING  "

## 2025-04-17 ENCOUNTER — APPOINTMENT (OUTPATIENT)
Dept: BEHAVIORAL HEALTH | Facility: CLINIC | Age: 30
End: 2025-04-17
Payer: COMMERCIAL

## 2025-04-24 ENCOUNTER — APPOINTMENT (OUTPATIENT)
Dept: BEHAVIORAL HEALTH | Facility: CLINIC | Age: 30
End: 2025-04-24
Payer: COMMERCIAL

## 2025-04-24 DIAGNOSIS — F41.1 GENERALIZED ANXIETY DISORDER: Primary | ICD-10-CM

## 2025-04-24 DIAGNOSIS — F33.8 SEASONAL AFFECTIVE DISORDER: ICD-10-CM

## 2025-04-24 PROCEDURE — 90832 PSYTX W PT 30 MINUTES: CPT | Performed by: PSYCHOLOGIST

## 2025-04-24 NOTE — PROGRESS NOTES
FOLLOW UP PSYCHOTHERAPY SESSION-VIRTUAL  Note dictated with [employer] transcription software. Completed without full typed error editing and sent to avoid delay.     START TIME: 7:30 PM  END TIME: 8 PM    Diagnoses: RAKESH, MDD, recurrent, moderate with seasonal pattern, History of OCD but no current symptoms      Past Medical History  Problems    · Patient denies significant medical history     Social History  Problems    · Never a smoker   · Occasional alcohol use     Mental Status Exam  No suicidal ideation nor intent.       Narrative     Telephone/Televideo Informed Consent for Psychotherapy was reviewed with the patient as follows:  There are potential benefits and risks of the use of telephone or video-conferencing that differ from in-person sessions. Specifically, the telephone or televideo system we are using may not be HIPAA compliant and may present limits to patient confidentiality. Confidentiality still applies for telepsychology services, and nobody will record the session without your permission. You agree to use the telephone or video-conferencing platform selected for our virtual sessions, and I will explain how to use it.  1) You need to use a webcam or smartphone during the session.  2) It is important that you be in a quiet, private space that is free of distractions (including cell phone or other devices) during the session.  3) It is important to use a secure internet connection rather than public/free Wi-Fi.  4) It is important to be on time. If you need to cancel or change your tele-appointment, you must notify the psychologist in advance by phone or email.  5) We need a back-up plan (e.g., phone number where you can be reached) to restart the session or to reschedule it, in the event of technical problems.  6) We need a safety plan that includes at least one emergency contact and the closest emergency room to your location, in the event of a crisis situation.  7) If you are not an  adult, we need the permission of your parent or legal guardian (and their contact information) for you to participate in telepsychology sessions.  Understanding and verbal agreement was attested to by the patient.    Patient reported that some of the things in his life have taken an unexpected turn.  He reported that he had to cancel our last session because he was told that he was going to get a call from the company that he is interviewing with.  Unfortunately, the call did not happen and he did not hear anything about why.  In fact, he still has not heard anything and it has been 2 weeks now since he was told that he is very likely going to be chosen for the position.  Patient seems like he is starting to assume he did not get the position.  In regards to dating, something similar happened.  One of the women that he was talking to, Mónica, does not seem like a good fit any longer.  We talked about dating and relationships more at the end of the session.  Patient asked about ways to further combat social anxiety.  We discussed how exposure is the best way for this and the differences between individuals in regards to how naturally outgoing they are.  We agreed to follow up on this more in the next session.    Plan:  Patient agreed to return for psychotherapy with this provider.      Individual Treatment Goals:     1. Eliminate OCD behaviors- ACHIEVED  2. Improve decision making ability- ACHIEVED  3. Improve ability to commit to decisions- ACHIEVED  4. Improve self-esteem-IMPROVING  5.  Decrease depressive symptoms- IMPROVING

## 2025-05-01 ENCOUNTER — APPOINTMENT (OUTPATIENT)
Dept: BEHAVIORAL HEALTH | Facility: CLINIC | Age: 30
End: 2025-05-01
Payer: COMMERCIAL

## 2025-05-01 DIAGNOSIS — F41.1 GENERALIZED ANXIETY DISORDER: ICD-10-CM

## 2025-05-01 DIAGNOSIS — F33.8 SEASONAL AFFECTIVE DISORDER: Primary | ICD-10-CM

## 2025-05-01 PROCEDURE — 90837 PSYTX W PT 60 MINUTES: CPT | Performed by: PSYCHOLOGIST

## 2025-05-02 NOTE — PROGRESS NOTES
FOLLOW UP PSYCHOTHERAPY SESSION-VIRTUAL  Note dictated with [employer] transcription software. Completed without full typed error editing and sent to avoid delay.     START TIME: 7 PM  END TIME: 8 PM    Diagnoses: RAKESH, MDD, recurrent, moderate with seasonal pattern, History of OCD but no current symptoms      Past Medical History  Problems    · Patient denies significant medical history     Social History  Problems    · Never a smoker   · Occasional alcohol use     Mental Status Exam  No suicidal ideation nor intent.       Narrative     Telephone/Televideo Informed Consent for Psychotherapy was reviewed with the patient as follows:  There are potential benefits and risks of the use of telephone or video-conferencing that differ from in-person sessions. Specifically, the telephone or televideo system we are using may not be HIPAA compliant and may present limits to patient confidentiality. Confidentiality still applies for telepsychology services, and nobody will record the session without your permission. You agree to use the telephone or video-conferencing platform selected for our virtual sessions, and I will explain how to use it.  1) You need to use a webcam or smartphone during the session.  2) It is important that you be in a quiet, private space that is free of distractions (including cell phone or other devices) during the session.  3) It is important to use a secure internet connection rather than public/free Wi-Fi.  4) It is important to be on time. If you need to cancel or change your tele-appointment, you must notify the psychologist in advance by phone or email.  5) We need a back-up plan (e.g., phone number where you can be reached) to restart the session or to reschedule it, in the event of technical problems.  6) We need a safety plan that includes at least one emergency contact and the closest emergency room to your location, in the event of a crisis situation.  7) If you are not an adult,  we need the permission of your parent or legal guardian (and their contact information) for you to participate in telepsychology sessions.  Understanding and verbal agreement was attested to by the patient.    Patient reported that things have been going well lately.  He said that he was asked by [employer], the company that he has been applying to, to come down to [place] to meet their .  Patient said that he was the only one invited and his fairly confident as is the hiring manager, that he will get an offer at that time.  Patient is looking forward to that so that he can have some closure on this and no more for certain what his future looks like.  He is questioning this a bit more lately.  We discussed in depth the various pros and cons of moving to this company instead.  It seems clear to both of us that this is the right way to go for him at this point in his career and life.  We also talked about his alcohol use.  Patient kept track of how much she drank over the last week and it turns out that he had 20 beers in total.  He admitted that this is actually less than some previous weeks but he still wants to reduce that of course.  I encouraged him to simply have the goal of drinking less than 20 over the next week and we can go from there but given that his anxiety and stress levels are going to be higher for at least another 2 weeks as he awaits the decision from [employer], it seems more realistic to try to cut down from there and make sure that he is still remaining aware of his use.  We agreed to follow up on this some more in the next session.  Plan:  Patient agreed to return for psychotherapy with this provider.      Individual Treatment Goals:     1. Eliminate OCD behaviors- ACHIEVED  2. Improve decision making ability- ACHIEVED  3. Improve ability to commit to decisions- ACHIEVED  4. Improve self-esteem-IMPROVING  5.  Decrease depressive symptoms- IMPROVING

## 2025-05-13 ENCOUNTER — APPOINTMENT (OUTPATIENT)
Dept: BEHAVIORAL HEALTH | Facility: CLINIC | Age: 30
End: 2025-05-13
Payer: COMMERCIAL

## 2025-05-13 DIAGNOSIS — F41.1 GENERALIZED ANXIETY DISORDER: Primary | ICD-10-CM

## 2025-05-13 PROCEDURE — 90837 PSYTX W PT 60 MINUTES: CPT | Performed by: PSYCHOLOGIST

## 2025-05-14 NOTE — PROGRESS NOTES
FOLLOW UP PSYCHOTHERAPY SESSION-VIRTUAL  Note dictated with [employer] transcription software. Completed without full typed error editing and sent to avoid delay.     START TIME: 4 PM  END TIME: 5 PM    Diagnoses: RAKESH, MDD, recurrent, moderate with seasonal pattern, History of OCD but no current symptoms      Past Medical History  Problems    · Patient denies significant medical history     Social History  Problems    · Never a smoker   · Occasional alcohol use     Mental Status Exam  No suicidal ideation nor intent.       Narrative     Telephone/Televideo Informed Consent for Psychotherapy was reviewed with the patient as follows:  There are potential benefits and risks of the use of telephone or video-conferencing that differ from in-person sessions. Specifically, the telephone or televideo system we are using may not be HIPAA compliant and may present limits to patient confidentiality. Confidentiality still applies for telepsychology services, and nobody will record the session without your permission. You agree to use the telephone or video-conferencing platform selected for our virtual sessions, and I will explain how to use it.  1) You need to use a webcam or smartphone during the session.  2) It is important that you be in a quiet, private space that is free of distractions (including cell phone or other devices) during the session.  3) It is important to use a secure internet connection rather than public/free Wi-Fi.  4) It is important to be on time. If you need to cancel or change your tele-appointment, you must notify the psychologist in advance by phone or email.  5) We need a back-up plan (e.g., phone number where you can be reached) to restart the session or to reschedule it, in the event of technical problems.  6) We need a safety plan that includes at least one emergency contact and the closest emergency room to your location, in the event of a crisis situation.  7) If you are not an adult,  we need the permission of your parent or legal guardian (and their contact information) for you to participate in telepsychology sessions.  Understanding and verbal agreement was attested to by the patient.    Patient stated that he is meeting with the  of [employer] tomorrow and spending the entire day with him essentially.  Patient explained that he is anxious about meeting with him and has been over thinking things.  He talked about the best way that he can be prepared for tomorrow.  I discouraged him from really even thinking about it because his thinking is only going to lead to worry which will make him more anxious and increase the likelihood of him doing something he regrets or embarrassing himself even though that likelihood is still very low.  We also talked about making sure that he does not pressure himself too much to get perfect sleep tonight.  We talked about the purpose of this meeting as well.  Patient initially saw it as an interview but this did not make a lot of sense to us after we discussed it because he was already told by the hiring manager that he was recommended for the job.  Ultimately, we came to the conclusion that it seems most likely that he is meeting with the  so that the  can convince him to join their company.  I reminded the patient that he has the negotiating power right now.  He has a job already and does not need to accept any offer from them.  We agreed that we would follow up on how this went in the next session.    Plan:  Patient agreed to return for psychotherapy with this provider.      Individual Treatment Goals:     1. Eliminate OCD behaviors- ACHIEVED  2. Improve decision making ability- ACHIEVED  3. Improve ability to commit to decisions- ACHIEVED  4. Improve self-esteem-IMPROVING  5.  Decrease depressive symptoms- IMPROVING

## 2025-05-20 ENCOUNTER — APPOINTMENT (OUTPATIENT)
Dept: BEHAVIORAL HEALTH | Facility: CLINIC | Age: 30
End: 2025-05-20
Payer: COMMERCIAL

## 2025-05-27 ENCOUNTER — APPOINTMENT (OUTPATIENT)
Dept: BEHAVIORAL HEALTH | Facility: CLINIC | Age: 30
End: 2025-05-27
Payer: COMMERCIAL

## 2025-05-27 DIAGNOSIS — F41.1 GENERALIZED ANXIETY DISORDER: Primary | ICD-10-CM

## 2025-05-27 PROCEDURE — 90837 PSYTX W PT 60 MINUTES: CPT | Performed by: PSYCHOLOGIST

## 2025-05-28 NOTE — PROGRESS NOTES
FOLLOW UP PSYCHOTHERAPY SESSION-VIRTUAL  Note dictated with [employer] transcription software. Completed without full typed error editing and sent to avoid delay.     START TIME: 8 PM  END TIME: 8:55 PM    Diagnoses: RAKESH, MDD, recurrent, moderate with seasonal pattern, History of OCD but no current symptoms      Past Medical History  Problems    · Patient denies significant medical history     Social History  Problems    · Never a smoker   · Occasional alcohol use     Mental Status Exam  No suicidal ideation nor intent.       Narrative     Telephone/Televideo Informed Consent for Psychotherapy was reviewed with the patient as follows:  There are potential benefits and risks of the use of telephone or video-conferencing that differ from in-person sessions. Specifically, the telephone or televideo system we are using may not be HIPAA compliant and may present limits to patient confidentiality. Confidentiality still applies for telepsychology services, and nobody will record the session without your permission. You agree to use the telephone or video-conferencing platform selected for our virtual sessions, and I will explain how to use it.  1) You need to use a webcam or smartphone during the session.  2) It is important that you be in a quiet, private space that is free of distractions (including cell phone or other devices) during the session.  3) It is important to use a secure internet connection rather than public/free Wi-Fi.  4) It is important to be on time. If you need to cancel or change your tele-appointment, you must notify the psychologist in advance by phone or email.  5) We need a back-up plan (e.g., phone number where you can be reached) to restart the session or to reschedule it, in the event of technical problems.  6) We need a safety plan that includes at least one emergency contact and the closest emergency room to your location, in the event of a crisis situation.  7) If you are not an  adult, we need the permission of your parent or legal guardian (and their contact information) for you to participate in telepsychology sessions.  Understanding and verbal agreement was attested to by the patient.    Patient reported that he has been doing well lately.  He stated that the interview he had went very well and he was given an offer.  The salary amount was what he expected and wanted but he is somewhat concerned about the allowance for a vehicle.  Patient said he has put about 80,000 miles on his car over the last 3 years and most of this was driving related to work although he does drive a decent amount to go back home to [place] to visit family.  He said that they offered him $800 a month for a lease and this is considered income so it is taxed which ultimately leaves him with probably about $550 a month for a lease.  The patient would certainly go over the mileage limit and so this could cost him even more.  He is currently negotiating with them on the salary piece but also this to see if he can get more.  He has not accepted the offer yet.  Overall, he feels very good about it.  He is waiting now to see if he actually has a noncompete clause in his contract but as it stands now, he does not.  We spent the remainder of the session discussing expectations that others have of him and how it affects his performance patient talked about how if people have high expectations of him, he worries that he is going to disappoint or fall short.  We discussed how to cope with this.  We discussed various strategies such as sort of ignoring any perceived expectations that others can have of him because he could never know and Sammie tell him directly, exactly what these expectations are.  We also discussed accepting that these expectations of him (generally, not really related to the expectations of a job position for example) came from somewhere and so they have to be valid at least somewhat even if he disagrees  with it.  Ultimately, we agreed that following back on the fact that people generally like him for who he is and will continue to do so as long as he is himself.  We agreed to follow up on this more in the next session.    Plan:  Patient agreed to return for psychotherapy with this provider.      Individual Treatment Goals:     1. Eliminate OCD behaviors- ACHIEVED  2. Improve decision making ability- ACHIEVED  3. Improve ability to commit to decisions- ACHIEVED  4. Improve self-esteem-IMPROVING  5.  Decrease depressive symptoms- IMPROVING

## 2025-05-29 ENCOUNTER — TELEPHONE (OUTPATIENT)
Dept: BEHAVIORAL HEALTH | Facility: CLINIC | Age: 30
End: 2025-05-29
Payer: COMMERCIAL

## 2025-05-29 DIAGNOSIS — F41.1 GENERALIZED ANXIETY DISORDER: ICD-10-CM

## 2025-05-29 RX ORDER — SERTRALINE HYDROCHLORIDE 100 MG/1
100 TABLET, FILM COATED ORAL DAILY
Qty: 90 TABLET | Refills: 1 | Status: SHIPPED | OUTPATIENT
Start: 2025-05-29

## 2025-06-03 ENCOUNTER — APPOINTMENT (OUTPATIENT)
Dept: BEHAVIORAL HEALTH | Facility: CLINIC | Age: 30
End: 2025-06-03
Payer: COMMERCIAL

## 2025-06-03 DIAGNOSIS — F41.1 GENERALIZED ANXIETY DISORDER: Primary | ICD-10-CM

## 2025-06-03 PROCEDURE — 90837 PSYTX W PT 60 MINUTES: CPT | Performed by: PSYCHOLOGIST

## 2025-06-03 NOTE — PROGRESS NOTES
FOLLOW UP PSYCHOTHERAPY SESSION-VIRTUAL  Note dictated with [employer] transcription software. Completed without full typed error editing and sent to avoid delay.     START TIME: 12 PM  END TIME: 12:55 PM    Diagnoses: RAKESH, MDD, recurrent, moderate with seasonal pattern, History of OCD but no current symptoms      Past Medical History  Problems    · Patient denies significant medical history     Social History  Problems    · Never a smoker   · Occasional alcohol use     Mental Status Exam  No suicidal ideation nor intent.       Narrative     Telephone/Televideo Informed Consent for Psychotherapy was reviewed with the patient as follows:  There are potential benefits and risks of the use of telephone or video-conferencing that differ from in-person sessions. Specifically, the telephone or televideo system we are using may not be HIPAA compliant and may present limits to patient confidentiality. Confidentiality still applies for telepsychology services, and nobody will record the session without your permission. You agree to use the telephone or video-conferencing platform selected for our virtual sessions, and I will explain how to use it.  1) You need to use a webcam or smartphone during the session.  2) It is important that you be in a quiet, private space that is free of distractions (including cell phone or other devices) during the session.  3) It is important to use a secure internet connection rather than public/free Wi-Fi.  4) It is important to be on time. If you need to cancel or change your tele-appointment, you must notify the psychologist in advance by phone or email.  5) We need a back-up plan (e.g., phone number where you can be reached) to restart the session or to reschedule it, in the event of technical problems.  6) We need a safety plan that includes at least one emergency contact and the closest emergency room to your location, in the event of a crisis situation.  7) If you are not an  adult, we need the permission of your parent or legal guardian (and their contact information) for you to participate in telepsychology sessions.  Understanding and verbal agreement was attested to by the patient.    Patient reported that he has been doing well lately.  Patient reported that his last day at work was yesterday.  He said he accepted the offer for the $125,000 base salary from [employer] and is excited to get started.  He was told that he could start anytime between today and July 1.  Patient said that he plans to take 2 weeks off.  We agreed that this is a good amount of time but not too much time to where he will start to worry about what is going to be like starting the new job.  Patient said that he was pleasantly surprised by his boss's reaction to him telling him that he is leaving the company.  Patient said that people have been calling him nonstop from [employer], asking him about the offer and where he is going to be working.  Patient said that this reminded him that he is well-respected there.  He talked about how much progress he has made socially.  Patient is in a much better place now.  We discussed at the end of the session to social situations in which she was assertive with people and they responded in a way that triggered him.  When the patient was growing up, he was frequently told that there is nothing to worry about and/or his mother would often make it about her rather than the patient so he learned not to express himself and that if expressing himself upset someone, he should apologize and try to make it up with them.  He responded differently in these 2 situations.  In 1, he was assertive with someone who did something to him that he did not like.  The person responded rather strongly by actually crying and being very upset with what they did.  This made the patient feel guilty about bringing it up in the first place.  However, we agreed that he was in the right.  The other  "situation involved his current \"girlfriend.\"  This 1 was a bit more complicated as it was a sign that perhaps the relationship is not going to work out but it was similar in that his needs were not respected and in fact, she actually tried to actively sabotage them.  Patient handled the situation well.  We agreed to follow up on these things more in the next session.    Plan:  Patient agreed to return for psychotherapy with this provider.      Individual Treatment Goals:     1. Eliminate OCD behaviors- ACHIEVED  2. Improve decision making ability- ACHIEVED  3. Improve ability to commit to decisions- ACHIEVED  4. Improve self-esteem-IMPROVING  5.  Decrease depressive symptoms- IMPROVING  "

## 2025-06-05 ENCOUNTER — APPOINTMENT (OUTPATIENT)
Dept: BEHAVIORAL HEALTH | Facility: CLINIC | Age: 30
End: 2025-06-05
Payer: COMMERCIAL

## 2025-06-05 DIAGNOSIS — F41.1 GENERALIZED ANXIETY DISORDER: Primary | ICD-10-CM

## 2025-06-05 DIAGNOSIS — F33.8 SEASONAL AFFECTIVE DISORDER: ICD-10-CM

## 2025-06-05 DIAGNOSIS — G47.00 INSOMNIA, UNSPECIFIED TYPE: ICD-10-CM

## 2025-06-05 PROCEDURE — 1036F TOBACCO NON-USER: CPT | Performed by: PSYCHIATRY & NEUROLOGY

## 2025-06-05 PROCEDURE — 99214 OFFICE O/P EST MOD 30 MIN: CPT | Performed by: PSYCHIATRY & NEUROLOGY

## 2025-06-05 ASSESSMENT — PATIENT HEALTH QUESTIONNAIRE - PHQ9
1. LITTLE INTEREST OR PLEASURE IN DOING THINGS: NOT AT ALL
2. FEELING DOWN, DEPRESSED OR HOPELESS: NOT AT ALL
SUM OF ALL RESPONSES TO PHQ9 QUESTIONS 1 AND 2: 0

## 2025-06-05 NOTE — PROGRESS NOTES
"Adult Ambulatory Psychiatry Progress Note    Pt is pulled over in car in [place]  Writer is at home office    Virtual or Telephone Consent    An interactive audio and video telecommunication system which permits real time communications between the patient (at the originating site) and provider (at the distant site) was utilized to provide this telehealth service.   Verbal consent was requested and obtained from Marino Barba on this date, 06/05/25 for a telehealth visit and the patient's location was confirmed at the time of the visit.      Assessment/Plan     Impression:  Marino Barba is a 30 y.o. male domiciled alone, employed as  who presents for follow up with CC of Depression, Anxiety, and Insomnia.    Plan:   RAKESH - sertraline 100mg daily, c/w gabatril 4mg BID, c/w med ed, psycho ed and supportive psychotherapy, f/u 6 months  Insomnia - trazodone 50mg qhs prn      Subjective     Chief Complaint: Depression, Anxiety, and Insomnia    HPI:  Pt arrived on time. Mood \"pretty good\" since last session. He was feeling a little down over the winter but that's resolved since spring. During that time he increased his sertraline to 150mg but is taking 100mg now. Denies significant depressed mood. Anxiety is higher. 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 He'll be flying more. He's having some anticipatory anxiety. Sleep is \"pretty good\". Appetite ok. Taking medicines as prescribed. Denies significant SE.           OARRS:  Rodrigo Hart MD on 6/5/2025 11:11 AM    Objective   Mental Status Exam:  General Appearance: Well groomed, appropriate eye contact  Attitude/Behavior: Cooperative  Motor: No psychomotor agitation or retardation, no tremor or other abnormal movements  Speech: Normal rate, volume, prosody  Mood: \"pretty good\"  Affect: Euthymic, full-range  Thought Process: Linear, goal directed  Thought Associations: No " loosening of associations  Thought Content: Normal  Perception: No perceptual abnormalities noted  Insight: Intact  Judgement: Intact    Vitals:  There were no vitals filed for this visit.    Current Medications:  Medications Ordered Prior to Encounter[1]    Lab Review:   No visits with results within 2 Month(s) from this visit.   Latest known visit with results is:   No results found for any previous visit.       Orders:  Diagnoses and all orders for this visit:  Generalized anxiety disorder  -     Follow Up In Psychiatry; Future  Insomnia, unspecified type  Seasonal affective disorder      Risk Assessment:  Risk of harm to self: Low Risk -- Risk factors include: Gender Protective factors include:Denies current suicidal ideation, Denies history of suicide attempts , Future-oriented talk , Willingness to seek help and support , Age, Skills in problem solving, conflict resolution, and nonviolent handling of disputes, Access to a variety of clinical interventions , Receiving and engaged in care for mental, physical, and substance use disorders , History of adhering to treatment recommendations and/or prescribed medication regimen , Support through ongoing medical and mental healthcare relationships , Current/history of good response to treatment/meds , and Interpersonal relationships and supports, e.g., family, friends, peers, community     Risk of harm to others: Low Risk - Risk factors include: Gender. Protective factors include: Lack of known history of harm to others , Lack of known history of violent ideation , Lack of known access to firearms , Sense of community, availability/access to resources and support , Sense of optimism, hope , Interpersonal competence , Affect regulation , Sense of self-efficacy, internal locus of control , and Positive, pro-social family/peer network     PHQ9  Over the past 2 weeks, how often have you been bothered by any of the following problems?  Little interest or pleasure in doing  things: Not at all  Feeling down, depressed, or hopeless: Not at all      Next Appointment:  Follow up in 6 months (on 12/2/2025).         [1]   Current Outpatient Medications on File Prior to Visit   Medication Sig Dispense Refill    ammonium lactate (Lac-Hydrin) 12 % lotion 1 Application.      MINOXIDIL-FINASTERIDE TOP Apply topically. MINOXIDIL 6%-FINASTERIDE0.3%      sertraline (Zoloft) 100 mg tablet Take 1 tablet (100 mg) by mouth once daily. 90 tablet 1    tiaGABine (Gabitril) 4 mg tablet Take 2 tablets (8 mg) by mouth once daily at bedtime. 180 tablet 1    traZODone (Desyrel) 50 mg tablet TAKE 1 TABLET (50 MG) BY MOUTH AS NEEDED AT BEDTIME FOR SLEEP. 90 tablet 1     No current facility-administered medications on file prior to visit.

## 2025-06-10 ENCOUNTER — APPOINTMENT (OUTPATIENT)
Dept: BEHAVIORAL HEALTH | Facility: CLINIC | Age: 30
End: 2025-06-10
Payer: COMMERCIAL

## 2025-06-10 DIAGNOSIS — F41.1 GENERALIZED ANXIETY DISORDER: Primary | ICD-10-CM

## 2025-06-10 PROCEDURE — 90837 PSYTX W PT 60 MINUTES: CPT | Performed by: PSYCHOLOGIST

## 2025-06-10 NOTE — PROGRESS NOTES
FOLLOW UP PSYCHOTHERAPY SESSION-VIRTUAL  Note dictated with [employer] transcription software. Completed without full typed error editing and sent to avoid delay.     START TIME: 12 PM  END TIME: 12:55 PM    Diagnoses: RAKESH, MDD, recurrent, moderate with seasonal pattern, History of OCD but no current symptoms      Past Medical History  Problems    · Patient denies significant medical history     Social History  Problems    · Never a smoker   · Occasional alcohol use     Mental Status Exam  No suicidal ideation nor intent.       Narrative     Telephone/Televideo Informed Consent for Psychotherapy was reviewed with the patient as follows:  There are potential benefits and risks of the use of telephone or video-conferencing that differ from in-person sessions. Specifically, the telephone or televideo system we are using may not be HIPAA compliant and may present limits to patient confidentiality. Confidentiality still applies for telepsychology services, and nobody will record the session without your permission. You agree to use the telephone or video-conferencing platform selected for our virtual sessions, and I will explain how to use it.  1) You need to use a webcam or smartphone during the session.  2) It is important that you be in a quiet, private space that is free of distractions (including cell phone or other devices) during the session.  3) It is important to use a secure internet connection rather than public/free Wi-Fi.  4) It is important to be on time. If you need to cancel or change your tele-appointment, you must notify the psychologist in advance by phone or email.  5) We need a back-up plan (e.g., phone number where you can be reached) to restart the session or to reschedule it, in the event of technical problems.  6) We need a safety plan that includes at least one emergency contact and the closest emergency room to your location, in the event of a crisis situation.  7) If you are not an  adult, we need the permission of your parent or legal guardian (and their contact information) for you to participate in telepsychology sessions.  Understanding and verbal agreement was attested to by the patient.    Patient reported that he has been doing well lately.  We started the session by discussing the financial decision that he made recently.  Patient said that he had his company car taken so he needed a vehicle.  Patient decided to purchase 1.  He made this decision based on the $800 a month stipend that he is going to get from his new job for a vehicle lease.  Patient said that the first car he purchased, he did not feel comfortable enough driving and it because his leg was rubbing up against the center console too much.  He tried to return the car but they would not take it back.  Instead, they agreed to give him a different model car.  His payment went up but the patient is only going to be responsible for about $150 a month of that.  He said that it is his dream car but he feels like it is somewhat irresponsible.  We talked about his decision and that it was thought out and ultimately he will end up making money on the car if he pays it off in 4 years and it lasts at least another year.  We also talked about him not feeling fufilled overall in his life because he is not in a committed relationship.  We processed his feelings related to this and agreed to follow up on it more in the next session.  Plan:  Patient agreed to return for psychotherapy with this provider.      Individual Treatment Goals:     1. Eliminate OCD behaviors- ACHIEVED  2. Improve decision making ability- ACHIEVED  3. Improve ability to commit to decisions- ACHIEVED  4. Improve self-esteem-IMPROVING  5.  Decrease depressive symptoms- IMPROVING

## 2025-06-24 ENCOUNTER — APPOINTMENT (OUTPATIENT)
Dept: BEHAVIORAL HEALTH | Facility: CLINIC | Age: 30
End: 2025-06-24
Payer: COMMERCIAL

## 2025-07-01 ENCOUNTER — APPOINTMENT (OUTPATIENT)
Dept: BEHAVIORAL HEALTH | Facility: CLINIC | Age: 30
End: 2025-07-01
Payer: COMMERCIAL

## 2025-07-01 DIAGNOSIS — F41.1 GENERALIZED ANXIETY DISORDER: Primary | ICD-10-CM

## 2025-07-01 NOTE — PROGRESS NOTES
FOLLOW UP PSYCHOTHERAPY SESSION-VIRTUAL  Note dictated with [employer] transcription software. Completed without full typed error editing and sent to avoid delay.     START TIME: 6 PM  END TIME: 6:55 PM    Diagnoses: RAKESH, MDD, recurrent, moderate with seasonal pattern, History of OCD but no current symptoms      Past Medical History  Problems    · Patient denies significant medical history     Social History  Problems    · Never a smoker   · Occasional alcohol use     Mental Status Exam  No suicidal ideation nor intent.       Narrative     Telephone/Televideo Informed Consent for Psychotherapy was reviewed with the patient as follows:  There are potential benefits and risks of the use of telephone or video-conferencing that differ from in-person sessions. Specifically, the telephone or televideo system we are using may not be HIPAA compliant and may present limits to patient confidentiality. Confidentiality still applies for telepsychology services, and nobody will record the session without your permission. You agree to use the telephone or video-conferencing platform selected for our virtual sessions, and I will explain how to use it.  1) You need to use a webcam or smartphone during the session.  2) It is important that you be in a quiet, private space that is free of distractions (including cell phone or other devices) during the session.  3) It is important to use a secure internet connection rather than public/free Wi-Fi.  4) It is important to be on time. If you need to cancel or change your tele-appointment, you must notify the psychologist in advance by phone or email.  5) We need a back-up plan (e.g., phone number where you can be reached) to restart the session or to reschedule it, in the event of technical problems.  6) We need a safety plan that includes at least one emergency contact and the closest emergency room to your location, in the event of a crisis situation.  7) If you are not an  adult, we need the permission of your parent or legal guardian (and their contact information) for you to participate in telepsychology sessions.  Understanding and verbal agreement was attested to by the patient.    Patient reported that things have been going well with him lately.  He stated that he has been working at his new job for about a week and a half now and he has a sale already.  Patient said that the environment is very different.  He stated that everyone is just kind.  Patient said that this makes him feel much more comfortable and there is far less stress.  He is spending much less time sitting in front of the computer as well which is exactly what he wants.  Patient said that he has been somewhat self-conscious because he is not sure what his boss thinks of him yet.  We talked about how this seems to be a problem generally for him and is unrelated to his current boss.  We agreed that a lot of it has to do with his difficulty assessing himself accurately or better put, his difficulty trusting his assessments of himself.  I encouraged the patient to do what been working for him this whole time because that is why he was hired.  We agreed that trying to do or be someone he is not naturally would not work anyway.  We agreed to follow up on this more in the next session.  Plan:  Patient agreed to return for psychotherapy with this provider.      Individual Treatment Goals:     1. Eliminate OCD behaviors- ACHIEVED  2. Improve decision making ability- ACHIEVED  3. Improve ability to commit to decisions- ACHIEVED  4. Improve self-esteem-IMPROVING  5.  Decrease depressive symptoms- IMPROVING

## 2025-07-08 ENCOUNTER — APPOINTMENT (OUTPATIENT)
Dept: BEHAVIORAL HEALTH | Facility: CLINIC | Age: 30
End: 2025-07-08
Payer: COMMERCIAL

## 2025-07-08 DIAGNOSIS — F41.1 GENERALIZED ANXIETY DISORDER: Primary | ICD-10-CM

## 2025-07-08 PROCEDURE — 90837 PSYTX W PT 60 MINUTES: CPT | Performed by: PSYCHOLOGIST

## 2025-07-09 NOTE — PROGRESS NOTES
FOLLOW UP PSYCHOTHERAPY SESSION-VIRTUAL  Note dictated with [employer] transcription software. Completed without full typed error editing and sent to avoid delay.     START TIME: 8 PM  END TIME: 8:55 PM    Diagnoses: RAKESH, MDD, recurrent, moderate with seasonal pattern, History of OCD but no current symptoms      Past Medical History  Problems    · Patient denies significant medical history     Social History  Problems    · Never a smoker   · Occasional alcohol use     Mental Status Exam  No suicidal ideation nor intent.       Narrative     Telephone/Televideo Informed Consent for Psychotherapy was reviewed with the patient as follows:  There are potential benefits and risks of the use of telephone or video-conferencing that differ from in-person sessions. Specifically, the telephone or televideo system we are using may not be HIPAA compliant and may present limits to patient confidentiality. Confidentiality still applies for telepsychology services, and nobody will record the session without your permission. You agree to use the telephone or video-conferencing platform selected for our virtual sessions, and I will explain how to use it.  1) You need to use a webcam or smartphone during the session.  2) It is important that you be in a quiet, private space that is free of distractions (including cell phone or other devices) during the session.  3) It is important to use a secure internet connection rather than public/free Wi-Fi.  4) It is important to be on time. If you need to cancel or change your tele-appointment, you must notify the psychologist in advance by phone or email.  5) We need a back-up plan (e.g., phone number where you can be reached) to restart the session or to reschedule it, in the event of technical problems.  6) We need a safety plan that includes at least one emergency contact and the closest emergency room to your location, in the event of a crisis situation.  7) If you are not an  "adult, we need the permission of your parent or legal guardian (and their contact information) for you to participate in telepsychology sessions.  Understanding and verbal agreement was attested to by the patient.    Patient reported that he still likes his new job and still believes that he made the right choice in moving to this company.  He reported that he has been disappointed in his friend Christopher for not reaching out like he used to. He's making assumptions as to why Christopher is not reaching out and is unsure if he should talk to him directly about this.  They are/were very close so I strongly recommended he talk to him about his feelings regarding this.  Patient reported that he still feels good about his relationship with Jada.  He stated that he is going on vacation with her family to the [place] and is somewhat nervous about getting burnt out on the social interaction because he is unlikely to have any time to himself.  He said he is sharing a room with her siblings, which makes him feel somewhat uncomfortable as well. We talked about how he has made great strides in being himself in social interactions and how \"putting on a mask\" in social interactions is more likely to lead to burn out.  Lastly, we talked about his tendency to self-sabotage and use this as an excuse for why he may not have performed at his best.  We agreed to talk more about this in the next session.    Plan:  Patient agreed to return for psychotherapy with this provider.      Individual Treatment Goals:     1. Eliminate OCD behaviors- ACHIEVED  2. Improve decision making ability- ACHIEVED  3. Improve ability to commit to decisions- ACHIEVED  4. Improve self-esteem-IMPROVING  5.  Decrease depressive symptoms- IMPROVING  "

## 2025-07-15 ENCOUNTER — APPOINTMENT (OUTPATIENT)
Dept: BEHAVIORAL HEALTH | Facility: CLINIC | Age: 30
End: 2025-07-15
Payer: COMMERCIAL

## 2025-07-31 ENCOUNTER — APPOINTMENT (OUTPATIENT)
Dept: BEHAVIORAL HEALTH | Facility: CLINIC | Age: 30
End: 2025-07-31
Payer: COMMERCIAL

## 2025-07-31 DIAGNOSIS — F41.1 GENERALIZED ANXIETY DISORDER: Primary | ICD-10-CM

## 2025-07-31 PROCEDURE — 90837 PSYTX W PT 60 MINUTES: CPT | Performed by: PSYCHOLOGIST

## 2025-08-01 NOTE — PROGRESS NOTES
FOLLOW UP PSYCHOTHERAPY SESSION-VIRTUAL  Note dictated with [employer] transcription software. Completed without full typed error editing and sent to avoid delay.     START TIME: 8 PM  END TIME: 8:55 PM    Diagnoses: RAKESH, MDD, recurrent, moderate with seasonal pattern, History of OCD but no current symptoms      Past Medical History  Problems    · Patient denies significant medical history     Social History  Problems    · Never a smoker   · Occasional alcohol use     Mental Status Exam  No suicidal ideation nor intent.       Narrative     Telephone/Televideo Informed Consent for Psychotherapy was reviewed with the patient as follows:  There are potential benefits and risks of the use of telephone or video-conferencing that differ from in-person sessions. Specifically, the telephone or televideo system we are using may not be HIPAA compliant and may present limits to patient confidentiality. Confidentiality still applies for telepsychology services, and nobody will record the session without your permission. You agree to use the telephone or video-conferencing platform selected for our virtual sessions, and I will explain how to use it.  1) You need to use a webcam or smartphone during the session.  2) It is important that you be in a quiet, private space that is free of distractions (including cell phone or other devices) during the session.  3) It is important to use a secure internet connection rather than public/free Wi-Fi.  4) It is important to be on time. If you need to cancel or change your tele-appointment, you must notify the psychologist in advance by phone or email.  5) We need a back-up plan (e.g., phone number where you can be reached) to restart the session or to reschedule it, in the event of technical problems.  6) We need a safety plan that includes at least one emergency contact and the closest emergency room to your location, in the event of a crisis situation.  7) If you are not an  "adult, we need the permission of your parent or legal guardian (and their contact information) for you to participate in telepsychology sessions.  Understanding and verbal agreement was attested to by the patient.    Patient reported that he is doing very well overall.  The topic of our session surrounded the idea of him being more comfortable in uncomfortable situations that he is in comfortable situations.  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  We discussed the possible reasons for this.  Essentially, the patient feels very anxious when things are going well because he starts to anticipate when they will go wrong.  We used several examples in which he seemingly preferred being in a situation that was more stressful than not.  Another example was his ex-girlfriend to call.  When we first met, the patient was about to move to [place] and essentially end his relationship with her.  He was only there a few weeks before coming back and trying to get back in a relationship with her.  This turned out to be a disaster as she had already dated someone else and over the next year or so, she went back and forth between him and the man that she is still with now.  I challenged the idea of being \"comfortable\" in these uncomfortable situations but recognizes that he seems to have a preference for these situations perhaps because the fact that anxiety is all anticipatory.  We agreed to follow up on this more in the next session.  Plan:  Patient agreed to return for psychotherapy with this provider.      Individual Treatment Goals:     1. Eliminate OCD behaviors- ACHIEVED  2. Improve decision making ability- ACHIEVED  3. Improve ability to commit to decisions- ACHIEVED  4. Improve self-esteem-IMPROVING  5.  Decrease depressive symptoms- IMPROVING  "

## 2025-08-05 ENCOUNTER — APPOINTMENT (OUTPATIENT)
Dept: BEHAVIORAL HEALTH | Facility: CLINIC | Age: 30
End: 2025-08-05
Payer: COMMERCIAL

## 2025-08-05 DIAGNOSIS — F41.1 GENERALIZED ANXIETY DISORDER: Primary | ICD-10-CM

## 2025-08-05 PROCEDURE — 90832 PSYTX W PT 30 MINUTES: CPT | Performed by: PSYCHOLOGIST

## 2025-08-05 NOTE — PROGRESS NOTES
FOLLOW UP PSYCHOTHERAPY SESSION-VIRTUAL  Note dictated with [employer] transcription software. Completed without full typed error editing and sent to avoid delay.     START TIME: 7:15 PM  END TIME: 7:38 PM    Diagnoses: RAKESH, MDD, recurrent, moderate with seasonal pattern, History of OCD but no current symptoms      Past Medical History  Problems    · Patient denies significant medical history     Social History  Problems    · Never a smoker   · Occasional alcohol use     Mental Status Exam  No suicidal ideation nor intent.       Narrative     Telephone/Televideo Informed Consent for Psychotherapy was reviewed with the patient as follows:  There are potential benefits and risks of the use of telephone or video-conferencing that differ from in-person sessions. Specifically, the telephone or televideo system we are using may not be HIPAA compliant and may present limits to patient confidentiality. Confidentiality still applies for telepsychology services, and nobody will record the session without your permission. You agree to use the telephone or video-conferencing platform selected for our virtual sessions, and I will explain how to use it.  1) You need to use a webcam or smartphone during the session.  2) It is important that you be in a quiet, private space that is free of distractions (including cell phone or other devices) during the session.  3) It is important to use a secure internet connection rather than public/free Wi-Fi.  4) It is important to be on time. If you need to cancel or change your tele-appointment, you must notify the psychologist in advance by phone or email.  5) We need a back-up plan (e.g., phone number where you can be reached) to restart the session or to reschedule it, in the event of technical problems.  6) We need a safety plan that includes at least one emergency contact and the closest emergency room to your location, in the event of a crisis situation.  7) If you are not an  adult, we need the permission of your parent or legal guardian (and their contact information) for you to participate in telepsychology sessions.  Understanding and verbal agreement was attested to by the patient.    Patient reported that work has been a bit stressful lately.  It sounds like he is primarily anxious about all the travel he is going to be doing.  He said it makes him feel somewhat trapped.  Patient said he would like to get involved with some regular group activity but is not interested in [employer] like he was before.  He wants something a bit less physical.  Unfortunately, the patient's phone abruptly  and he was unable to get back on the call.  He sent a message via email to me explaining what happened.  We plan to follow up on all this in the next session.    Plan:  Patient agreed to return for psychotherapy with this provider.      Individual Treatment Goals:     1. Eliminate OCD behaviors- ACHIEVED  2. Improve decision making ability- ACHIEVED  3. Improve ability to commit to decisions- ACHIEVED  4. Improve self-esteem-IMPROVING  5.  Decrease depressive symptoms- IMPROVING

## 2025-08-12 ENCOUNTER — APPOINTMENT (OUTPATIENT)
Dept: BEHAVIORAL HEALTH | Facility: CLINIC | Age: 30
End: 2025-08-12
Payer: COMMERCIAL

## 2025-08-12 DIAGNOSIS — F41.1 GENERALIZED ANXIETY DISORDER: Primary | ICD-10-CM

## 2025-08-12 PROCEDURE — 90837 PSYTX W PT 60 MINUTES: CPT | Performed by: PSYCHOLOGIST

## 2025-08-19 ENCOUNTER — APPOINTMENT (OUTPATIENT)
Dept: BEHAVIORAL HEALTH | Facility: CLINIC | Age: 30
End: 2025-08-19
Payer: COMMERCIAL

## 2025-08-28 ENCOUNTER — APPOINTMENT (OUTPATIENT)
Dept: BEHAVIORAL HEALTH | Facility: CLINIC | Age: 30
End: 2025-08-28
Payer: COMMERCIAL

## 2025-08-28 DIAGNOSIS — F41.1 GENERALIZED ANXIETY DISORDER: Primary | ICD-10-CM

## 2025-08-28 DIAGNOSIS — G47.00 INSOMNIA, UNSPECIFIED TYPE: ICD-10-CM

## 2025-08-28 DIAGNOSIS — F41.1 GENERALIZED ANXIETY DISORDER: ICD-10-CM

## 2025-08-28 PROCEDURE — 90837 PSYTX W PT 60 MINUTES: CPT | Performed by: PSYCHOLOGIST

## 2025-08-28 RX ORDER — TIAGABINE HYDROCHLORIDE 4 MG/1
8 TABLET, FILM COATED ORAL NIGHTLY
Qty: 180 TABLET | Refills: 1 | Status: SHIPPED | OUTPATIENT
Start: 2025-08-28 | End: 2026-02-24

## 2025-09-11 ENCOUNTER — APPOINTMENT (OUTPATIENT)
Dept: BEHAVIORAL HEALTH | Facility: CLINIC | Age: 30
End: 2025-09-11
Payer: COMMERCIAL

## 2025-09-18 ENCOUNTER — APPOINTMENT (OUTPATIENT)
Dept: BEHAVIORAL HEALTH | Facility: CLINIC | Age: 30
End: 2025-09-18
Payer: COMMERCIAL

## 2025-12-02 ENCOUNTER — APPOINTMENT (OUTPATIENT)
Dept: BEHAVIORAL HEALTH | Facility: CLINIC | Age: 30
End: 2025-12-02
Payer: COMMERCIAL